# Patient Record
Sex: MALE | Race: WHITE | Employment: OTHER | URBAN - METROPOLITAN AREA
[De-identification: names, ages, dates, MRNs, and addresses within clinical notes are randomized per-mention and may not be internally consistent; named-entity substitution may affect disease eponyms.]

---

## 2020-11-16 LAB
ANTIBODY: NORMAL
CHOLESTEROL, TOTAL: 278 MG/DL
CHOLESTEROL/HDL RATIO: NORMAL
HDLC SERPL-MCNC: 69 MG/DL (ref 35–70)
HIV AG/AB: NORMAL
LDL CHOLESTEROL: 189
NONHDLC SERPL-MCNC: 209 MG/DL
TRIGL SERPL-MCNC: 98 MG/DL
VLDLC SERPL CALC-MCNC: NORMAL MG/DL

## 2023-04-12 PROBLEM — M77.12 LATERAL EPICONDYLITIS OF LEFT ELBOW: Status: ACTIVE | Noted: 2020-11-16

## 2023-04-12 PROBLEM — M79.671 PAIN IN BOTH FEET: Status: ACTIVE | Noted: 2019-07-10

## 2023-04-12 PROBLEM — M54.81 CERVICO-OCCIPITAL NEURALGIA: Status: ACTIVE | Noted: 2017-11-29

## 2023-04-12 PROBLEM — R42 DIZZINESS AFTER EXTENSION OF NECK: Status: ACTIVE | Noted: 2019-08-02

## 2023-04-12 PROBLEM — M96.1 CERVICAL POST-LAMINECTOMY SYNDROME: Status: ACTIVE | Noted: 2018-09-05

## 2023-04-12 PROBLEM — M79.672 PAIN IN BOTH FEET: Status: ACTIVE | Noted: 2019-07-10

## 2023-04-12 PROBLEM — R07.89 OTHER CHEST PAIN: Status: ACTIVE | Noted: 2019-08-02

## 2023-04-12 PROBLEM — F52.21 ED (ERECTILE DYSFUNCTION) OF NON-ORGANIC ORIGIN: Status: ACTIVE | Noted: 2020-11-16

## 2023-04-12 PROBLEM — K43.2 INCISIONAL HERNIA, WITHOUT OBSTRUCTION OR GANGRENE: Status: ACTIVE | Noted: 2019-08-29

## 2023-04-12 PROBLEM — M72.2 PLANTAR FASCIITIS, RIGHT: Status: ACTIVE | Noted: 2019-07-10

## 2023-04-12 PROBLEM — G43.909 MIGRAINE: Status: ACTIVE | Noted: 2017-11-29

## 2023-04-17 ENCOUNTER — TELEPHONE (OUTPATIENT)
Dept: FAMILY MEDICINE CLINIC | Age: 58
End: 2023-04-17

## 2023-04-17 NOTE — TELEPHONE ENCOUNTER
----- Message from Steven Parker sent at 4/17/2023 11:23 AM EDT -----  Subject: Medication Problem    Medication: amoxicillin-clavulanate (AUGMENTIN) 875-125 MG per tablet  Dosage: 875-125mg twice per day  Ordering Provider: thais    Question/Problem: patient is having stomach issues, cramping, diarrhea ,   no helping his cough, patient has taken medication for 5 days, but is not   tolerating the medication , needs something different , was told to call   back after 3 days if not improvement       Pharmacy: [facility]inne Ju[facility] [address]   [phone] Liliana Organ [phone]    ---------------------------------------------------------------------------  --------------  [address] [facility]  [phone]; OK to leave message on voicemail  ---------------------------------------------------------------------------  --------------    SCRIPT ANSWERS  Relationship to Patient: Self

## 2023-04-24 RX ORDER — OMEPRAZOLE 40 MG/1
40 CAPSULE, DELAYED RELEASE ORAL DAILY
Qty: 30 CAPSULE | Refills: 0 | Status: SHIPPED | OUTPATIENT
Start: 2023-04-24

## 2023-04-24 RX ORDER — METOPROLOL SUCCINATE 25 MG/1
25 TABLET, EXTENDED RELEASE ORAL DAILY
Qty: 30 TABLET | Refills: 0 | Status: SHIPPED | OUTPATIENT
Start: 2023-04-24

## 2023-04-24 RX ORDER — FEXOFENADINE HCL 180 MG/1
180 TABLET ORAL DAILY
Qty: 90 TABLET | Refills: 1 | Status: SHIPPED | OUTPATIENT
Start: 2023-04-24 | End: 2023-10-21

## 2023-04-24 RX ORDER — AMLODIPINE BESYLATE 5 MG/1
5 TABLET ORAL DAILY
Qty: 30 TABLET | Refills: 0 | Status: SHIPPED | OUTPATIENT
Start: 2023-04-24

## 2023-04-24 RX ORDER — CHLORTHALIDONE 25 MG/1
25 TABLET ORAL DAILY
Qty: 30 TABLET | Refills: 0 | Status: SHIPPED | OUTPATIENT
Start: 2023-04-24

## 2023-05-03 ENCOUNTER — TELEPHONE (OUTPATIENT)
Dept: FAMILY MEDICINE CLINIC | Age: 58
End: 2023-05-03

## 2023-05-11 ENCOUNTER — OFFICE VISIT (OUTPATIENT)
Dept: ENT CLINIC | Age: 58
End: 2023-05-11
Payer: MEDICARE

## 2023-05-11 VITALS — WEIGHT: 185 LBS | RESPIRATION RATE: 16 BRPM | HEIGHT: 70 IN | TEMPERATURE: 98 F | BODY MASS INDEX: 26.48 KG/M2

## 2023-05-11 DIAGNOSIS — M27.0 TORUS PALATINUS: Primary | ICD-10-CM

## 2023-05-11 PROCEDURE — 3017F COLORECTAL CA SCREEN DOC REV: CPT | Performed by: OTOLARYNGOLOGY

## 2023-05-11 PROCEDURE — G8427 DOCREV CUR MEDS BY ELIG CLIN: HCPCS | Performed by: OTOLARYNGOLOGY

## 2023-05-11 PROCEDURE — 1036F TOBACCO NON-USER: CPT | Performed by: OTOLARYNGOLOGY

## 2023-05-11 PROCEDURE — G8419 CALC BMI OUT NRM PARAM NOF/U: HCPCS | Performed by: OTOLARYNGOLOGY

## 2023-05-11 PROCEDURE — 99202 OFFICE O/P NEW SF 15 MIN: CPT | Performed by: OTOLARYNGOLOGY

## 2023-05-11 ASSESSMENT — ENCOUNTER SYMPTOMS
WHEEZING: 0
ABDOMINAL PAIN: 0
SHORTNESS OF BREATH: 0

## 2023-05-11 NOTE — PROGRESS NOTES
[address], [address], [facility] Patricia Canelo  P: [phone]       Patient     Alvin Gupta  1965    Chief Concern     Chief Complaint   Patient presents with    New Patient     States that he has a growth on the roof of his mouth, states that he would not have known it was there until he got food stuck in it    Ear Problem     States that he has been cleaning his ears constantly       Assessment and Plan      Diagnosis Orders   1. Torus palatinus          61-year-old male with concern for mass in the roof of his mouth-on examination we appreciate a bony torus palatinus, no tenderness to palpation, no overlying skin breakdown or masses. Notify the patient that this was likely a benign mass, asked him to call back if it is growing over time or if he is having significant difficulty forming a bolus due to mass effect from the bone. No follow-ups on file. History of Present Illness     61-year-old male with concern for growth on the roof of his mouth-noted it approximately 1 month ago when a black bean got caught on it, since then has had food caught on it 2-3 times, but no wilman dysphagia/odynophagia, voice changes, cough. Denies fevers, chills, night sweats, rapid weight loss, neck masses; no history of tobacco use.     Past Medical History     Past Medical History:   Diagnosis Date    Lymph node cancer ([address]     Testicle cancer ([address]        Past Surgical History     Past Surgical History:   Procedure Laterality Date    CERVICAL FUSION  1989    ORCHIECTOMY Left 1999    SHOULDER SURGERY Bilateral     both shoulders x 3 each    TONSILLECTOMY         Family History     Family History   Problem Relation Age of Onset    Hypertension Mother     Diabetes Father     Hypertension Father     Colon Cancer Father     Colon Cancer Paternal Grandfather        Social History     Social History     Tobacco Use    Smoking status: Never    Smokeless tobacco: Never   Vaping Use

## 2023-05-15 RX ORDER — OMEPRAZOLE 40 MG/1
CAPSULE, DELAYED RELEASE ORAL
Qty: 30 CAPSULE | Refills: 5 | Status: SHIPPED | OUTPATIENT
Start: 2023-05-15

## 2023-06-02 RX ORDER — METOPROLOL SUCCINATE 25 MG/1
25 TABLET, EXTENDED RELEASE ORAL DAILY
Qty: 90 TABLET | OUTPATIENT
Start: 2023-06-02

## 2023-06-02 RX ORDER — METOPROLOL SUCCINATE 25 MG/1
25 TABLET, EXTENDED RELEASE ORAL DAILY
Qty: 30 TABLET | Refills: 0 | Status: SHIPPED | OUTPATIENT
Start: 2023-06-02

## 2023-06-02 NOTE — TELEPHONE ENCOUNTER
Refill Request       Last Seen: Last Seen Department: 4/12/2023  Last Seen by PCP: 4/12/2023    Last Written: 04/24/2023    Has an appt with Cardiology on Tuesday     Next Appointment:   Future Appointments   Date Time Provider Arnaldo Jarai   6/6/2023  1:00 PM MD KIMBERLY Claudio   6/27/2023  1:10 PM Maral Holt MD [facility]         Requested Prescriptions     Pending Prescriptions Disp Refills    metoprolol succinate (TOPROL XL) 25 MG extended release tablet 30 tablet 0     Sig: Take 1 tablet by mouth daily

## 2023-06-06 ENCOUNTER — OFFICE VISIT (OUTPATIENT)
Dept: CARDIOLOGY CLINIC | Age: 58
End: 2023-06-06
Payer: MEDICARE

## 2023-06-06 VITALS
SYSTOLIC BLOOD PRESSURE: 118 MMHG | WEIGHT: 198 LBS | HEART RATE: 70 BPM | BODY MASS INDEX: 28.35 KG/M2 | HEIGHT: 70 IN | OXYGEN SATURATION: 96 % | DIASTOLIC BLOOD PRESSURE: 74 MMHG

## 2023-06-06 DIAGNOSIS — E78.5 HYPERLIPIDEMIA, UNSPECIFIED HYPERLIPIDEMIA TYPE: ICD-10-CM

## 2023-06-06 DIAGNOSIS — I25.10 CORONARY ARTERY DISEASE INVOLVING NATIVE CORONARY ARTERY OF NATIVE HEART WITHOUT ANGINA PECTORIS: ICD-10-CM

## 2023-06-06 DIAGNOSIS — R06.02 SOB (SHORTNESS OF BREATH): ICD-10-CM

## 2023-06-06 DIAGNOSIS — I10 ESSENTIAL HYPERTENSION: Primary | ICD-10-CM

## 2023-06-06 DIAGNOSIS — R42 DIZZINESS: ICD-10-CM

## 2023-06-06 PROBLEM — I25.119 CORONARY ARTERY DISEASE INVOLVING NATIVE CORONARY ARTERY OF NATIVE HEART WITH ANGINA PECTORIS (HCC): Status: ACTIVE | Noted: 2023-06-06

## 2023-06-06 PROCEDURE — G8419 CALC BMI OUT NRM PARAM NOF/U: HCPCS | Performed by: INTERNAL MEDICINE

## 2023-06-06 PROCEDURE — 3074F SYST BP LT 130 MM HG: CPT | Performed by: INTERNAL MEDICINE

## 2023-06-06 PROCEDURE — 3078F DIAST BP <80 MM HG: CPT | Performed by: INTERNAL MEDICINE

## 2023-06-06 PROCEDURE — G8427 DOCREV CUR MEDS BY ELIG CLIN: HCPCS | Performed by: INTERNAL MEDICINE

## 2023-06-06 PROCEDURE — 99204 OFFICE O/P NEW MOD 45 MIN: CPT | Performed by: INTERNAL MEDICINE

## 2023-06-06 RX ORDER — METHOCARBAMOL 750 MG/1
750 TABLET, FILM COATED ORAL 3 TIMES DAILY
COMMUNITY

## 2023-06-06 RX ORDER — MULTIVIT-MIN/IRON/FOLIC ACID/K 18-600-40
CAPSULE ORAL
COMMUNITY

## 2023-06-06 RX ORDER — OXYCODONE AND ACETAMINOPHEN 7.5; 325 MG/1; MG/1
1 TABLET ORAL EVERY 4 HOURS PRN
COMMUNITY

## 2023-06-06 NOTE — PROGRESS NOTES
with other health care professionals  () Ordering of unique tests, medications, or procedures  (X) Documentation within the EHR         All questions and concerns were addressed to the patient/family. Alternatives to my treatment were discussed. The note was completed using EMR. Every effort was made to ensure accuracy; however, inadvertent computerized transcription errors may be present.     Remington Ahmadi MD, [address], [place], [place]  [phone] [facility] office  [phone] [facility]  6/6/2023  1:09 PM

## 2023-06-06 NOTE — PATIENT INSTRUCTIONS
Your provider has ordered testing for further evaluation. An order/prescription has been included in your paper work. To schedule outpatient testing, contact Central Scheduling by calling [facility] ([phone]). Plan:  1. Order echocardiogram ~ shortness of breath and dizziness  ~A test that records movement of your heart valves and chambers by ultrasound. Evaluates heart valves, any chamber enlargement, abnormal openings, or any fluid in the sac surrounding the heart. 2. Order GXT stress myoview ~ shortness of breath and dizziness  ~A small IV will be placed into your arm to administer a radioisotope (myoview) to visualize your heart. . You will then waiting period to allow the tracer to be absorbed by the heart muscle. After the waiting period, we will take pictures of the blood flow to your heart muscle with the nuclear camera. Following your pictures, we will perform your stress test. We can do your stress test by having you walk on the treadmill or by giving you a medication (Chen Brendan) which makes your body think it is exercising. We will monitor you before, during, and after your stress test to make sure you are safe and comfortable. 3. I recommend that the patient continue their currently prescribed medications. Their drug modifiable risk factors appear to be well controlled. I will continue to address the need/dosing of medications in future visits. 4. Follow up based on testing results.

## 2023-06-22 ENCOUNTER — TELEPHONE (OUTPATIENT)
Dept: FAMILY MEDICINE CLINIC | Age: 58
End: 2023-06-22

## 2023-06-22 NOTE — TELEPHONE ENCOUNTER
Patient called and stated that the [facility] mail order pharmacy had his Norvasc medication at 10mg from when he lived in [place]. The patient stated that he has been taking 5 mg since his appt with Dr. Deanna Blackwood in April.  The patient just wanted to make sure that 5mg was the correct amount and if not, then a new order will need to be placed for the 10mg

## 2023-06-26 RX ORDER — LOSARTAN POTASSIUM 100 MG/1
100 TABLET ORAL DAILY
Qty: 90 TABLET | Refills: 3 | Status: SHIPPED | OUTPATIENT
Start: 2023-06-26

## 2023-07-10 RX ORDER — METOPROLOL SUCCINATE 25 MG/1
25 TABLET, EXTENDED RELEASE ORAL DAILY
Qty: 90 TABLET | Refills: 1 | Status: SHIPPED | OUTPATIENT
Start: 2023-07-10

## 2023-07-10 RX ORDER — METOPROLOL SUCCINATE 25 MG/1
25 TABLET, EXTENDED RELEASE ORAL DAILY
Qty: 30 TABLET | Refills: 0 | Status: SHIPPED | OUTPATIENT
Start: 2023-07-10 | End: 2023-07-10

## 2023-07-10 NOTE — TELEPHONE ENCOUNTER
Refill Request       Last Seen: Last Seen Department: 4/12/2023  Last Seen by PCP: 4/12/2023    Last Written: 6/2/23      Next Appointment:   Future Appointments   Date Time Provider [address] [place] [address]   7/24/2023  2:30 PM Albina Whitehead MD [facility]   8/2/2023 11:30 AM SCHEDULE, [facility] Zackery CERNA       Requested Prescriptions     Pending Prescriptions Disp Refills    metoprolol succinate (TOPROL XL) 25 MG extended release tablet [Pharmacy Med Name: METOPROLOL ER SUCCINATE 25MG TABS] 30 tablet 0     Sig: TAKE 1 TABLET BY MOUTH DAILY

## 2023-08-01 SDOH — HEALTH STABILITY: PHYSICAL HEALTH: ON AVERAGE, HOW MANY DAYS PER WEEK DO YOU ENGAGE IN MODERATE TO STRENUOUS EXERCISE (LIKE A BRISK WALK)?: 2 DAYS

## 2023-08-01 ASSESSMENT — PATIENT HEALTH QUESTIONNAIRE - PHQ9
10. IF YOU CHECKED OFF ANY PROBLEMS, HOW DIFFICULT HAVE THESE PROBLEMS MADE IT FOR YOU TO DO YOUR WORK, TAKE CARE OF THINGS AT HOME, OR GET ALONG WITH OTHER PEOPLE: 0
SUM OF ALL RESPONSES TO PHQ QUESTIONS 1-9: 9
6. FEELING BAD ABOUT YOURSELF - OR THAT YOU ARE A FAILURE OR HAVE LET YOURSELF OR YOUR FAMILY DOWN: 0
9. THOUGHTS THAT YOU WOULD BE BETTER OFF DEAD, OR OF HURTING YOURSELF: 0
SUM OF ALL RESPONSES TO PHQ QUESTIONS 1-9: 9
4. FEELING TIRED OR HAVING LITTLE ENERGY: 3
7. TROUBLE CONCENTRATING ON THINGS, SUCH AS READING THE NEWSPAPER OR WATCHING TELEVISION: 0
3. TROUBLE FALLING OR STAYING ASLEEP: 3
8. MOVING OR SPEAKING SO SLOWLY THAT OTHER PEOPLE COULD HAVE NOTICED. OR THE OPPOSITE, BEING SO FIGETY OR RESTLESS THAT YOU HAVE BEEN MOVING AROUND A LOT MORE THAN USUAL: 0
1. LITTLE INTEREST OR PLEASURE IN DOING THINGS: 1
SUM OF ALL RESPONSES TO PHQ9 QUESTIONS 1 & 2: 2
2. FEELING DOWN, DEPRESSED OR HOPELESS: 1
SUM OF ALL RESPONSES TO PHQ QUESTIONS 1-9: 9
5. POOR APPETITE OR OVEREATING: 1
SUM OF ALL RESPONSES TO PHQ QUESTIONS 1-9: 9

## 2023-08-01 ASSESSMENT — LIFESTYLE VARIABLES
HOW MANY STANDARD DRINKS CONTAINING ALCOHOL DO YOU HAVE ON A TYPICAL DAY: PATIENT DOES NOT DRINK
HOW OFTEN DO YOU HAVE A DRINK CONTAINING ALCOHOL: MONTHLY OR LESS
HOW MANY STANDARD DRINKS CONTAINING ALCOHOL DO YOU HAVE ON A TYPICAL DAY: 0
HOW OFTEN DO YOU HAVE SIX OR MORE DRINKS ON ONE OCCASION: 1
HOW OFTEN DO YOU HAVE A DRINK CONTAINING ALCOHOL: 2

## 2023-08-02 ENCOUNTER — TELEMEDICINE (OUTPATIENT)
Dept: FAMILY MEDICINE CLINIC | Age: 58
End: 2023-08-02
Payer: MEDICARE

## 2023-08-02 DIAGNOSIS — Z00.00 MEDICARE ANNUAL WELLNESS VISIT, SUBSEQUENT: Primary | ICD-10-CM

## 2023-08-02 DIAGNOSIS — Z00.00 INITIAL MEDICARE ANNUAL WELLNESS VISIT: ICD-10-CM

## 2023-08-02 PROCEDURE — 3017F COLORECTAL CA SCREEN DOC REV: CPT | Performed by: STUDENT IN AN ORGANIZED HEALTH CARE EDUCATION/TRAINING PROGRAM

## 2023-08-02 PROCEDURE — G0439 PPPS, SUBSEQ VISIT: HCPCS | Performed by: STUDENT IN AN ORGANIZED HEALTH CARE EDUCATION/TRAINING PROGRAM

## 2023-08-02 NOTE — PATIENT INSTRUCTIONS
Personalized Preventive Plan for Khadijah Sneed - 8/2/2023  Medicare offers a range of preventive health benefits. Some of the tests and screenings are paid in full while other may be subject to a deductible, co-insurance, and/or copay. Some of these benefits include a comprehensive review of your medical history including lifestyle, illnesses that may run in your family, and various assessments and screenings as appropriate. After reviewing your medical record and screening and assessments performed today your provider may have ordered immunizations, labs, imaging, and/or referrals for you. A list of these orders (if applicable) as well as your Preventive Care list are included within your After Visit Summary for your review. Other Preventive Recommendations:    A preventive eye exam performed by an eye specialist is recommended every 1-2 years to screen for glaucoma; cataracts, macular degeneration, and other eye disorders. A preventive dental visit is recommended every 6 months. Try to get at least 150 minutes of exercise per week or 10,000 steps per day on a pedometer . Order or download the FREE \"Exercise & Physical Activity: Your Everyday Guide\" from [employer]. Call 0-664.261.4975 or search [employer] online. You need 6440-9293 mg of calcium and 0040-3284 IU of vitamin D per day. It is possible to meet your calcium requirement with diet alone, but a vitamin D supplement is usually necessary to meet this goal.  When exposed to the sun, use a sunscreen that protects against both UVA and UVB radiation with an SPF of 30 or greater. Reapply every 2 to 3 hours or after sweating, drying off with a towel, or swimming. Always wear a seat belt when traveling in a car. Always wear a helmet when riding a bicycle or motorcycle. Learning About Mindfulness for Stress  What are mindfulness and stress? Stress is your body's response to a hard situation.

## 2023-08-02 NOTE — PROGRESS NOTES
Medicare Annual Wellness Visit    Génesis Bullock is here for Medicare AWV    Assessment & Plan   Medicare annual wellness visit, subsequent  Recommendations for Preventive Services Due: see orders and patient instructions/AVS.  Recommended screening schedule for the next 5-10 years is provided to the patient in written form: see Patient Instructions/AVS.     No follow-ups on file. Subjective       Patient's complete Health Risk Assessment and screening values have been reviewed and are found in Flowsheets. The following problems were reviewed today and where indicated follow up appointments were made and/or referrals ordered. Positive Risk Factor Screenings with Interventions:        Depression:  PHQ-2 Score: 2  PHQ-9 Total Score: 9    Interpretation:   1-4 = minimal  5-9 = mild  10-14 = moderate  15-19 = moderately severe  20-27 = severe  Interventions: Will discuss sleeping issues  and pain with pain specialist- has appt today           Opioid Risk: (Low risk score <55) Opioid risk score: 17    Patient is low risk for opioid use disorder or overdose. Last PDMP Polo Torres as Reviewed:  Review User Review Instant Review Result   Trevor Duque 6/27/2023  1:18 PM     Reviewed PDMP [1]         General HRA Questions:  Select all that apply: (!) New or Increased Fatigue    Fatigue Interventions:  States not sleeping as well since change in pain meds- will discuss with pain management specialist at appt today        Dentist Screen:  Have you seen the dentist within the past year?: (!) No    Intervention:  Advised to schedule with their dentist     Vision Screen:  Do you have difficulty driving, watching TV, or doing any of your daily activities because of your eyesight?: No  Have you had an eye exam within the past year?: (!) No  No results found.     Interventions:   Patient encouraged to make appointment with their eye specialist    Safety:  Do you have either shower bars, grab bars, non-slip mats or non-slip

## 2023-08-09 RX ORDER — OMEPRAZOLE 40 MG/1
CAPSULE, DELAYED RELEASE ORAL
Qty: 90 CAPSULE | OUTPATIENT
Start: 2023-08-09

## 2023-09-09 ENCOUNTER — TELEPHONE (OUTPATIENT)
Dept: FAMILY MEDICINE CLINIC | Age: 58
End: 2023-09-09

## 2023-09-09 DIAGNOSIS — U07.1 COVID-19: Primary | ICD-10-CM

## 2023-09-12 ENCOUNTER — TELEPHONE (OUTPATIENT)
Dept: FAMILY MEDICINE CLINIC | Age: 58
End: 2023-09-12

## 2023-09-12 RX ORDER — ONDANSETRON 4 MG/1
4 TABLET, ORALLY DISINTEGRATING ORAL 3 TIMES DAILY PRN
Qty: 21 TABLET | Refills: 0 | Status: SHIPPED | OUTPATIENT
Start: 2023-09-12

## 2023-09-12 NOTE — TELEPHONE ENCOUNTER
Patient called and has been taking Paxlovid for Covid. Patient has been taking for 3 full days. Patient has 5 days left of it. Patient states that he is not able to keep anything down, dry heaving, all smells are making him sick. Patient stated that he hasn't been able to take his other medications because of this. He did not know what he should do, he is concerned because he hasn't been able to take other meds. He was wondering if this was a side effect of the paxlovid?

## 2023-09-12 NOTE — TELEPHONE ENCOUNTER
I spoke to the patient who stated that he has been able to keep an egg sandwich down today and has been drinking some. He states that he just feels naseous 24/7, he did urinate this  morning as well.  Patient would like for zofran to be sent to [facility] in [place] on 28

## 2023-09-18 RX ORDER — FEXOFENADINE HCL 180 MG/1
180 TABLET ORAL DAILY
Qty: 90 TABLET | Refills: 1 | Status: SHIPPED | OUTPATIENT
Start: 2023-09-18

## 2023-09-18 NOTE — TELEPHONE ENCOUNTER
Refill Request     Last Seen: Last Seen Department: 8/2/2023  Last Seen by PCP: 8/2/2023    Last Written: 4/24/23      Next Appointment:   Future Appointments   Date Time Provider [address] [place] [address]   10/3/2023  2:10 PM CHARITO Chandler CNP [facility]         Requested Prescriptions     Pending Prescriptions Disp Refills    fexofenadine (ALLEGRA) 180 MG tablet [Pharmacy Med Name: FEXOFENADINE HYDROCHLORIDE 180 MG Tablet] 90 tablet 1     Sig: TAKE 1 TABLET EVERY DAY

## 2023-10-03 PROBLEM — M48.02 CERVICAL STENOSIS OF SPINAL CANAL: Status: ACTIVE | Noted: 2023-10-03

## 2023-10-03 PROBLEM — M47.812 FACET ARTHROPATHY, CERVICAL: Status: ACTIVE | Noted: 2023-10-03

## 2023-12-05 RX ORDER — OMEPRAZOLE 40 MG/1
CAPSULE, DELAYED RELEASE ORAL
Qty: 90 CAPSULE | Refills: 3 | Status: SHIPPED | OUTPATIENT
Start: 2023-12-05

## 2023-12-06 RX ORDER — METOPROLOL SUCCINATE 25 MG/1
25 TABLET, EXTENDED RELEASE ORAL DAILY
Qty: 90 TABLET | Refills: 1 | Status: SHIPPED | OUTPATIENT
Start: 2023-12-06

## 2023-12-06 NOTE — PROGRESS NOTES
Refill Request     Last Seen: Last Seen Department: 8/2/2023  Last Seen by PCP: Visit date not found    Last Written: 7/10/23    Next Appointment:   Future Appointments   Date Time Provider [address] [place] [address]   12/21/2023 10:40 AM Mitzi Astorga, APRN - CNP [facility]       Requested Prescriptions     Pending Prescriptions Disp Refills    metoprolol succinate (TOPROL XL) 25 MG extended release tablet 90 tablet 1     Sig: Take 1 tablet by mouth daily

## 2023-12-07 RX ORDER — LOSARTAN POTASSIUM 100 MG/1
100 TABLET ORAL DAILY
Qty: 90 TABLET | Refills: 1 | Status: SHIPPED | OUTPATIENT
Start: 2023-12-07

## 2023-12-07 NOTE — TELEPHONE ENCOUNTER
Pts pharmacy called for a refill of Losartan 100mg.  Preferred pharmacy is [facility]    Last ov 06/06/2023 rickey

## 2023-12-21 PROBLEM — M50.20 CERVICAL DISC DISPLACEMENT: Status: ACTIVE | Noted: 2023-12-21

## 2024-03-04 RX ORDER — GABAPENTIN 400 MG/1
400 CAPSULE ORAL 3 TIMES DAILY
Qty: 90 CAPSULE | Refills: 0 | Status: SHIPPED | OUTPATIENT
Start: 2024-03-04 | End: 2024-04-01

## 2024-03-04 NOTE — TELEPHONE ENCOUNTER
Refill Request     Last Seen: Last Seen Department: 8/2/2023  Last Seen by PCP: 8/2/2023    Last Written: 04/12/23      Next Appointment:   Future Appointments   Date Time Provider Department Center   4/10/2024  3:30 PM Demi Astorga APRN - CNP [facility]           Requested Prescriptions     Pending Prescriptions Disp Refills    gabapentin (NEURONTIN) 400 MG capsule [Pharmacy Med Name: GABAPENTIN 400MG CAPSULES] 90 capsule 2     Sig: TAKE 1 CAPSULE BY MOUTH THREE TIMES DAILY

## 2024-03-21 ASSESSMENT — PATIENT HEALTH QUESTIONNAIRE - PHQ9
3. TROUBLE FALLING OR STAYING ASLEEP: SEVERAL DAYS
1. LITTLE INTEREST OR PLEASURE IN DOING THINGS: NOT AT ALL
7. TROUBLE CONCENTRATING ON THINGS, SUCH AS READING THE NEWSPAPER OR WATCHING TELEVISION: NOT AT ALL
4. FEELING TIRED OR HAVING LITTLE ENERGY: SEVERAL DAYS
4. FEELING TIRED OR HAVING LITTLE ENERGY: SEVERAL DAYS
3. TROUBLE FALLING OR STAYING ASLEEP: SEVERAL DAYS
SUM OF ALL RESPONSES TO PHQ9 QUESTIONS 1 & 2: 1
7. TROUBLE CONCENTRATING ON THINGS, SUCH AS READING THE NEWSPAPER OR WATCHING TELEVISION: NOT AT ALL
SUM OF ALL RESPONSES TO PHQ QUESTIONS 1-9: 3
5. POOR APPETITE OR OVEREATING: NOT AT ALL
SUM OF ALL RESPONSES TO PHQ QUESTIONS 1-9: 3
1. LITTLE INTEREST OR PLEASURE IN DOING THINGS: NOT AT ALL
10. IF YOU CHECKED OFF ANY PROBLEMS, HOW DIFFICULT HAVE THESE PROBLEMS MADE IT FOR YOU TO DO YOUR WORK, TAKE CARE OF THINGS AT HOME, OR GET ALONG WITH OTHER PEOPLE: SOMEWHAT DIFFICULT
6. FEELING BAD ABOUT YOURSELF - OR THAT YOU ARE A FAILURE OR HAVE LET YOURSELF OR YOUR FAMILY DOWN: NOT AT ALL
9. THOUGHTS THAT YOU WOULD BE BETTER OFF DEAD, OR OF HURTING YOURSELF: NOT AT ALL
5. POOR APPETITE OR OVEREATING: NOT AT ALL
8. MOVING OR SPEAKING SO SLOWLY THAT OTHER PEOPLE COULD HAVE NOTICED. OR THE OPPOSITE, BEING SO FIGETY OR RESTLESS THAT YOU HAVE BEEN MOVING AROUND A LOT MORE THAN USUAL: NOT AT ALL
9. THOUGHTS THAT YOU WOULD BE BETTER OFF DEAD, OR OF HURTING YOURSELF: NOT AT ALL
8. MOVING OR SPEAKING SO SLOWLY THAT OTHER PEOPLE COULD HAVE NOTICED. OR THE OPPOSITE - BEING SO FIDGETY OR RESTLESS THAT YOU HAVE BEEN MOVING AROUND A LOT MORE THAN USUAL: NOT AT ALL
10. IF YOU CHECKED OFF ANY PROBLEMS, HOW DIFFICULT HAVE THESE PROBLEMS MADE IT FOR YOU TO DO YOUR WORK, TAKE CARE OF THINGS AT HOME, OR GET ALONG WITH OTHER PEOPLE: SOMEWHAT DIFFICULT
2. FEELING DOWN, DEPRESSED OR HOPELESS: SEVERAL DAYS
SUM OF ALL RESPONSES TO PHQ QUESTIONS 1-9: 3
2. FEELING DOWN, DEPRESSED OR HOPELESS: SEVERAL DAYS
6. FEELING BAD ABOUT YOURSELF - OR THAT YOU ARE A FAILURE OR HAVE LET YOURSELF OR YOUR FAMILY DOWN: NOT AT ALL
SUM OF ALL RESPONSES TO PHQ QUESTIONS 1-9: 3
SUM OF ALL RESPONSES TO PHQ QUESTIONS 1-9: 3

## 2024-03-25 ENCOUNTER — OFFICE VISIT (OUTPATIENT)
Dept: FAMILY MEDICINE CLINIC | Age: 59
End: 2024-03-25
Payer: MEDICARE

## 2024-03-25 VITALS
DIASTOLIC BLOOD PRESSURE: 84 MMHG | WEIGHT: 205.4 LBS | SYSTOLIC BLOOD PRESSURE: 128 MMHG | HEART RATE: 59 BPM | OXYGEN SATURATION: 95 % | BODY MASS INDEX: 29.47 KG/M2

## 2024-03-25 DIAGNOSIS — K62.5 BRBPR (BRIGHT RED BLOOD PER RECTUM): ICD-10-CM

## 2024-03-25 DIAGNOSIS — R63.1 POLYDIPSIA: ICD-10-CM

## 2024-03-25 DIAGNOSIS — E87.6 HYPOKALEMIA: ICD-10-CM

## 2024-03-25 DIAGNOSIS — R73.01 IMPAIRED FASTING BLOOD SUGAR: ICD-10-CM

## 2024-03-25 DIAGNOSIS — S39.012A BACK STRAIN, INITIAL ENCOUNTER: Primary | ICD-10-CM

## 2024-03-25 DIAGNOSIS — R35.89 POLYURIA: ICD-10-CM

## 2024-03-25 DIAGNOSIS — R13.12 OROPHARYNGEAL DYSPHAGIA: ICD-10-CM

## 2024-03-25 DIAGNOSIS — M54.81 CERVICO-OCCIPITAL NEURALGIA: ICD-10-CM

## 2024-03-25 DIAGNOSIS — K21.9 GASTROESOPHAGEAL REFLUX DISEASE, UNSPECIFIED WHETHER ESOPHAGITIS PRESENT: ICD-10-CM

## 2024-03-25 DIAGNOSIS — R29.898 WEAKNESS OF BOTH UPPER EXTREMITIES: ICD-10-CM

## 2024-03-25 DIAGNOSIS — I10 ESSENTIAL HYPERTENSION: ICD-10-CM

## 2024-03-25 LAB
ALBUMIN SERPL-MCNC: 4.6 G/DL (ref 3.4–5)
ALBUMIN/GLOB SERPL: 1.8 {RATIO} (ref 1.1–2.2)
ALP SERPL-CCNC: 56 U/L (ref 40–129)
ALT SERPL-CCNC: 22 U/L (ref 10–40)
ANION GAP SERPL CALCULATED.3IONS-SCNC: 12 MMOL/L (ref 3–16)
AST SERPL-CCNC: 19 U/L (ref 15–37)
BASOPHILS # BLD: 0.1 K/UL (ref 0–0.2)
BASOPHILS NFR BLD: 1 %
BILIRUB SERPL-MCNC: 0.7 MG/DL (ref 0–1)
BILIRUBIN, POC: NEGATIVE
BLOOD URINE, POC: NEGATIVE
BUN SERPL-MCNC: 13 MG/DL (ref 7–20)
CALCIUM SERPL-MCNC: 9.7 MG/DL (ref 8.3–10.6)
CHLORIDE SERPL-SCNC: 95 MMOL/L (ref 99–110)
CLARITY, POC: NORMAL
CO2 SERPL-SCNC: 30 MMOL/L (ref 21–32)
COLOR, POC: NORMAL
CREAT SERPL-MCNC: 0.8 MG/DL (ref 0.8–1.3)
DEPRECATED RDW RBC AUTO: 13 % (ref 12.4–15.4)
EOSINOPHIL # BLD: 0.2 K/UL (ref 0–0.6)
EOSINOPHIL NFR BLD: 2.9 %
GFR SERPLBLD CREATININE-BSD FMLA CKD-EPI: >90 ML/MIN/{1.73_M2}
GLUCOSE SERPL-MCNC: 97 MG/DL (ref 70–99)
GLUCOSE URINE, POC: NEGATIVE
HCT VFR BLD AUTO: 42.8 % (ref 40.5–52.5)
HGB BLD-MCNC: 15.7 G/DL (ref 13.5–17.5)
KETONES, POC: NEGATIVE
LEUKOCYTE EST, POC: NEGATIVE
LYMPHOCYTES # BLD: 1.9 K/UL (ref 1–5.1)
LYMPHOCYTES NFR BLD: 28.9 %
MCH RBC QN AUTO: 31.4 PG (ref 26–34)
MCHC RBC AUTO-ENTMCNC: 36.5 G/DL (ref 31–36)
MCV RBC AUTO: 86 FL (ref 80–100)
MONOCYTES # BLD: 0.6 K/UL (ref 0–1.3)
MONOCYTES NFR BLD: 9.8 %
NEUTROPHILS # BLD: 3.8 K/UL (ref 1.7–7.7)
NEUTROPHILS NFR BLD: 57.4 %
NITRITE, POC: NEGATIVE
PH, POC: 7.5
PLATELET # BLD AUTO: 261 K/UL (ref 135–450)
PMV BLD AUTO: 7.9 FL (ref 5–10.5)
POTASSIUM SERPL-SCNC: 3.9 MMOL/L (ref 3.5–5.1)
PROT SERPL-MCNC: 7.2 G/DL (ref 6.4–8.2)
PROTEIN, POC: NEGATIVE
RBC # BLD AUTO: 4.98 M/UL (ref 4.2–5.9)
SODIUM SERPL-SCNC: 137 MMOL/L (ref 136–145)
SPECIFIC GRAVITY, POC: 1.02
TSH SERPL DL<=0.005 MIU/L-ACNC: 1.42 UIU/ML (ref 0.27–4.2)
UROBILINOGEN, POC: 0.2
WBC # BLD AUTO: 6.6 K/UL (ref 4–11)

## 2024-03-25 PROCEDURE — 3079F DIAST BP 80-89 MM HG: CPT | Performed by: STUDENT IN AN ORGANIZED HEALTH CARE EDUCATION/TRAINING PROGRAM

## 2024-03-25 PROCEDURE — G8427 DOCREV CUR MEDS BY ELIG CLIN: HCPCS | Performed by: STUDENT IN AN ORGANIZED HEALTH CARE EDUCATION/TRAINING PROGRAM

## 2024-03-25 PROCEDURE — G8419 CALC BMI OUT NRM PARAM NOF/U: HCPCS | Performed by: STUDENT IN AN ORGANIZED HEALTH CARE EDUCATION/TRAINING PROGRAM

## 2024-03-25 PROCEDURE — G8484 FLU IMMUNIZE NO ADMIN: HCPCS | Performed by: STUDENT IN AN ORGANIZED HEALTH CARE EDUCATION/TRAINING PROGRAM

## 2024-03-25 PROCEDURE — 1036F TOBACCO NON-USER: CPT | Performed by: STUDENT IN AN ORGANIZED HEALTH CARE EDUCATION/TRAINING PROGRAM

## 2024-03-25 PROCEDURE — 3017F COLORECTAL CA SCREEN DOC REV: CPT | Performed by: STUDENT IN AN ORGANIZED HEALTH CARE EDUCATION/TRAINING PROGRAM

## 2024-03-25 PROCEDURE — 36415 COLL VENOUS BLD VENIPUNCTURE: CPT | Performed by: STUDENT IN AN ORGANIZED HEALTH CARE EDUCATION/TRAINING PROGRAM

## 2024-03-25 PROCEDURE — 3074F SYST BP LT 130 MM HG: CPT | Performed by: STUDENT IN AN ORGANIZED HEALTH CARE EDUCATION/TRAINING PROGRAM

## 2024-03-25 PROCEDURE — 99214 OFFICE O/P EST MOD 30 MIN: CPT | Performed by: STUDENT IN AN ORGANIZED HEALTH CARE EDUCATION/TRAINING PROGRAM

## 2024-03-25 PROCEDURE — 81002 URINALYSIS NONAUTO W/O SCOPE: CPT | Performed by: STUDENT IN AN ORGANIZED HEALTH CARE EDUCATION/TRAINING PROGRAM

## 2024-03-25 RX ORDER — TIZANIDINE 4 MG/1
4 TABLET ORAL 3 TIMES DAILY PRN
Qty: 30 TABLET | Refills: 0 | Status: SHIPPED | OUTPATIENT
Start: 2024-03-25

## 2024-03-25 RX ORDER — PREDNISONE 10 MG/1
TABLET ORAL
Qty: 30 TABLET | Refills: 0 | Status: SHIPPED | OUTPATIENT
Start: 2024-03-25 | End: 2024-04-06

## 2024-03-25 ASSESSMENT — ENCOUNTER SYMPTOMS
SHORTNESS OF BREATH: 0
BACK PAIN: 1
COUGH: 0
RHINORRHEA: 0

## 2024-03-25 NOTE — PROGRESS NOTES
Lower extremity strength 5 out of 5 bilaterally.  Upper extremity strength 4 out of 4 with spastic movements.  Negative Spurling.   Neurological:      General: No focal deficit present.      Mental Status: He is alert.   Psychiatric:         Mood and Affect: Mood normal.         Lupillo Phillips,     This dictation was generated by voice recognition computer software.  Although all attempts are made to edit the dictation for accuracy, there may be errors in the transcription that are not intended.

## 2024-03-26 LAB
EST. AVERAGE GLUCOSE BLD GHB EST-MCNC: 111.2 MG/DL
HBA1C MFR BLD: 5.5 %
OSMOLALITY UR: 626 MOSM/KG (ref 390–1070)

## 2024-04-01 RX ORDER — GABAPENTIN 400 MG/1
400 CAPSULE ORAL 3 TIMES DAILY
Qty: 90 CAPSULE | Refills: 0 | Status: SHIPPED | OUTPATIENT
Start: 2024-04-01 | End: 2024-05-01

## 2024-04-01 NOTE — TELEPHONE ENCOUNTER
Refill Request     Last Seen: Last Seen Department: 3/25/2024  Last Seen by PCP: 3/25/2024    Last Written: 3/4/24      Next Appointment:   Future Appointments   Date Time Provider Department Center   4/10/2024  3:30 PM Demi Astorga APRN - CNP [facility]           Requested Prescriptions     Pending Prescriptions Disp Refills    gabapentin (NEURONTIN) 400 MG capsule [Pharmacy Med Name: GABAPENTIN 400MG CAPSULES] 90 capsule 0     Sig: TAKE 1 CAPSULE BY MOUTH THREE TIMES DAILY

## 2024-04-10 PROBLEM — G89.4 CHRONIC PAIN SYNDROME: Status: ACTIVE | Noted: 2024-04-10

## 2024-04-25 RX ORDER — FEXOFENADINE HCL 180 MG/1
180 TABLET ORAL DAILY
Qty: 90 TABLET | Refills: 3 | Status: SHIPPED | OUTPATIENT
Start: 2024-04-25

## 2024-04-25 NOTE — TELEPHONE ENCOUNTER
Refill Request     Last Seen: Last Seen Department: 3/25/2024  Last Seen by PCP: 3/25/2024    Last Written: 9/18/23      Next Appointment:   Future Appointments   Date Time Provider Department Center   6/4/2024  2:40 PM Demi Astorga APRN - CNP [facility]           Requested Prescriptions     Pending Prescriptions Disp Refills    fexofenadine (ALLEGRA) 180 MG tablet [Pharmacy Med Name: FEXOFENADINE HYDROCHLORIDE 180 MG Tablet] 90 tablet 3     Sig: TAKE 1 TABLET EVERY DAY

## 2024-04-26 NOTE — TELEPHONE ENCOUNTER
6/6/2023 [facility]  No upcoming appt  3/25/2024 cmp, cbc  Jacqueline contact pt for yearly appt/med refill appt

## 2024-04-29 RX ORDER — GABAPENTIN 400 MG/1
400 CAPSULE ORAL 3 TIMES DAILY
Qty: 90 CAPSULE | Refills: 0 | OUTPATIENT
Start: 2024-04-29

## 2024-04-29 RX ORDER — LOSARTAN POTASSIUM 100 MG/1
100 TABLET ORAL DAILY
Qty: 30 TABLET | Refills: 1 | Status: SHIPPED | OUTPATIENT
Start: 2024-04-29

## 2024-04-29 NOTE — TELEPHONE ENCOUNTER
Refill Request     Last Seen: Last Seen Department: 3/25/2024  Last Seen by PCP: 3/25/2024    Last Written: 4/1/24      Next Appointment:   Future Appointments   Date Time Provider Department Center   6/4/2024  2:40 PM Demi Astorga APRN - CNP [facility]           Requested Prescriptions     Pending Prescriptions Disp Refills    gabapentin (NEURONTIN) 400 MG capsule [Pharmacy Med Name: GABAPENTIN 400MG CAPSULES] 90 capsule 0     Sig: TAKE 1 CAPSULE BY MOUTH THREE TIMES DAILY

## 2024-04-29 NOTE — TELEPHONE ENCOUNTER
4/29- called pt @[phone]. Lvm informing pt to call back to schedule annual apt with [facility] for continuation of med refills.     6/06/2023 [facility]

## 2024-05-02 RX ORDER — GABAPENTIN 400 MG/1
400 CAPSULE ORAL 3 TIMES DAILY
Qty: 90 CAPSULE | Refills: 2 | Status: SHIPPED | OUTPATIENT
Start: 2024-05-02 | End: 2024-07-31

## 2024-05-02 RX ORDER — GABAPENTIN 400 MG/1
400 CAPSULE ORAL 3 TIMES DAILY
Qty: 90 CAPSULE | Refills: 0 | OUTPATIENT
Start: 2024-05-02

## 2024-05-02 NOTE — TELEPHONE ENCOUNTER
Refill Request         Last Seen: Last Seen Department: 3/25/2024  Last Seen by PCP: 2/16/2024    Last Written: 04/01/2024      Next Appointment:   Future Appointments   Date Time Provider Department Center   6/4/2024  2:40 PM Demi Astorga APRN - CNP [facility]   6/18/2024  1:45 PM eDvika Suero MD [facility]         Requested Prescriptions     Pending Prescriptions Disp Refills    gabapentin (NEURONTIN) 400 MG capsule [Pharmacy Med Name: GABAPENTIN 400MG CAPSULES] 90 capsule 0     Sig: Take 1 capsule by mouth 3 times daily.

## 2024-06-17 RX ORDER — LOSARTAN POTASSIUM 100 MG/1
100 TABLET ORAL DAILY
Qty: 30 TABLET | Refills: 0 | Status: SHIPPED | OUTPATIENT
Start: 2024-06-17

## 2024-06-18 ENCOUNTER — TELEPHONE (OUTPATIENT)
Dept: CARDIOLOGY CLINIC | Age: 59
End: 2024-06-18

## 2024-06-18 ENCOUNTER — OFFICE VISIT (OUTPATIENT)
Dept: CARDIOLOGY CLINIC | Age: 59
End: 2024-06-18
Payer: MEDICARE

## 2024-06-18 VITALS
HEIGHT: 70 IN | BODY MASS INDEX: 28.63 KG/M2 | OXYGEN SATURATION: 95 % | WEIGHT: 200 LBS | SYSTOLIC BLOOD PRESSURE: 108 MMHG | HEART RATE: 52 BPM | DIASTOLIC BLOOD PRESSURE: 74 MMHG

## 2024-06-18 DIAGNOSIS — R00.2 PALPITATIONS: ICD-10-CM

## 2024-06-18 DIAGNOSIS — R06.02 SHORTNESS OF BREATH: Primary | ICD-10-CM

## 2024-06-18 DIAGNOSIS — R06.02 SOB (SHORTNESS OF BREATH): ICD-10-CM

## 2024-06-18 DIAGNOSIS — R42 DIZZINESS: ICD-10-CM

## 2024-06-18 DIAGNOSIS — R09.89 OTHER SPECIFIED SYMPTOMS AND SIGNS INVOLVING THE CIRCULATORY AND RESPIRATORY SYSTEMS: ICD-10-CM

## 2024-06-18 DIAGNOSIS — Z82.49 FAMILY HISTORY OF CAROTID ENDARTERECTOMY: ICD-10-CM

## 2024-06-18 DIAGNOSIS — I25.119 CORONARY ARTERY DISEASE INVOLVING NATIVE CORONARY ARTERY OF NATIVE HEART WITH ANGINA PECTORIS (HCC): ICD-10-CM

## 2024-06-18 DIAGNOSIS — I25.10 CORONARY ARTERY CALCIFICATION SEEN ON CT SCAN: ICD-10-CM

## 2024-06-18 PROCEDURE — 1036F TOBACCO NON-USER: CPT | Performed by: INTERNAL MEDICINE

## 2024-06-18 PROCEDURE — 3074F SYST BP LT 130 MM HG: CPT | Performed by: INTERNAL MEDICINE

## 2024-06-18 PROCEDURE — G8419 CALC BMI OUT NRM PARAM NOF/U: HCPCS | Performed by: INTERNAL MEDICINE

## 2024-06-18 PROCEDURE — 3078F DIAST BP <80 MM HG: CPT | Performed by: INTERNAL MEDICINE

## 2024-06-18 PROCEDURE — G8427 DOCREV CUR MEDS BY ELIG CLIN: HCPCS | Performed by: INTERNAL MEDICINE

## 2024-06-18 PROCEDURE — 3017F COLORECTAL CA SCREEN DOC REV: CPT | Performed by: INTERNAL MEDICINE

## 2024-06-18 PROCEDURE — 99214 OFFICE O/P EST MOD 30 MIN: CPT | Performed by: INTERNAL MEDICINE

## 2024-06-18 NOTE — PROGRESS NOTES
[facility] [place]   Cardiovascular Evaluation    PATIENT: Tono Grossman  DATE: 2024  MRN: [id number]  CSN: [id number]  : 1965    Primary Care Doctor/Referring provider: Lupillo Phillips DO, No admitting provider for patient encounter.     Reason for evaluation/Chief complaint:   Follow-up, Hypertension, Coronary Artery Disease, Hyperlipidemia, and Shortness of Breath      Subjective:    History of present illness on initial date of evaluation:   Tono Grossman is a 58 y.o. patient who presents for cardiology follow up. He initially was seen to establish cardiology care from moving from [place]. He has a past medical history including CAD, GERD, HTN, HLD, family history of early CAD, and anxiety. He had a exercise stress echocardiogram 19 study was normal. He had a CT Chest 2020 with calcific coronary plaque in LAD. He had a Lexiscan myoview 10/2022 with fixed defect, no ischemia.   Today he reports he is recovering from COVID. He states he lives on third floor. He has noticed SOB with ambulating to his floor with walking. He has noted SOB with exertion. Patient currently denies any weight gain, edema, palpitations, chest pain, dizziness, and syncope. He is taking medications as prescribed. He states he a smart watch that has been indicating irregular heart beat. He will feel dizziness and heart racing. Patient reports father had carotid endarterectomy age 70's.     Patient Active Problem List   Diagnosis    Attention deficit disorder    Allergic rhinitis    Asthma    Brachial neuritis or radiculitis    Cervical post-laminectomy syndrome    Cervico-occipital neuralgia    Dizziness after extension of neck    ED (erectile dysfunction) of non-organic origin    Essential hypertension    Gastroesophageal reflux disease    High prostate specific antigen (PSA)    Incisional hernia, without obstruction or gangrene    Lateral epicondylitis of left elbow    Migraine

## 2024-06-18 NOTE — TELEPHONE ENCOUNTER
Monitor placed by Berna  Monitor company [employer]  Length of monitor 2 weeks  Monitor ordered by [facility]  Phone ID [email or web address]  Kit ID [id number], [id number]  Activation successful prior to pt leaving office? Yes

## 2024-06-18 NOTE — PATIENT INSTRUCTIONS
Your provider has ordered testing for further evaluation.  An order/prescription has been included in your paper work.   To schedule outpatient testing, contact Central Scheduling by calling [facility] ([phone]).     GXT or Lexiscan Myoview Stress Test instructions:   -Allow 3-4 hours for the entire test to be complete.  -Nothing to eat or drink after midnight prior to testing. May take prescribed medications in morning with sip of water.  -Hold diabetes medication and Insulin morning of testing. Bring glucose meter and glucose tablet if available.   -Do not drink or eat anything containing caffeine 24 hours prior to test. This includes coffee, decaffeinated coffee, chocolate, soda, or tea.  -Hold metoprolol succinate for treadmill (GXT) stress testing hold for 48 hours prior to test. Bring medication with you to testing.   -No smoking for 12 hours prior to testing.       Plan:  1. Order echocardiogram ~ shortness of breath, dizziness  2. Order GXT stress myoview ~ shortness of breath, dizziness  3. Order cardiac event monitor 2 weeks ~ palpitations   4. Order carotid doppler~ family history of carotid endarterectomy, prior stent history CAD  5. Follow up dependent on results, if within normal limits one year follow up

## 2024-06-24 ENCOUNTER — HOSPITAL ENCOUNTER (OUTPATIENT)
Dept: VASCULAR LAB | Age: 59
Discharge: HOME | End: 2024-06-26
Attending: INTERNAL MEDICINE
Payer: MEDICARE

## 2024-06-24 DIAGNOSIS — I25.119 CORONARY ARTERY DISEASE INVOLVING NATIVE CORONARY ARTERY OF NATIVE HEART WITH ANGINA PECTORIS (HCC): ICD-10-CM

## 2024-06-24 DIAGNOSIS — Z82.49 FAMILY HISTORY OF CAROTID ENDARTERECTOMY: ICD-10-CM

## 2024-06-24 DIAGNOSIS — R42 DIZZINESS: ICD-10-CM

## 2024-06-24 LAB
VAS LEFT ARM BP: 115 MMHG
VAS LEFT CCA DIST EDV: 23.2 CM/S
VAS LEFT CCA DIST PSV: 87.2 CM/S
VAS LEFT CCA MID EDV: 23.2 CM/S
VAS LEFT CCA MID PSV: 106 CM/S
VAS LEFT CCA PROX EDV: 22.3 CM/S
VAS LEFT CCA PROX PSV: 133 CM/S
VAS LEFT ECA EDV: 13.5 CM/S
VAS LEFT ECA PSV: 57.8 CM/S
VAS LEFT ICA DIST EDV: 22.7 CM/S
VAS LEFT ICA DIST PSV: 62.1 CM/S
VAS LEFT ICA MID EDV: 15.2 CM/S
VAS LEFT ICA MID PSV: 47.9 CM/S
VAS LEFT ICA PROX EDV: 19.9 CM/S
VAS LEFT ICA PROX PSV: 61.5 CM/S
VAS LEFT ICA/CCA PSV: 0.59
VAS LEFT SUBCLAVIAN PROX PSV: 104 CM/S
VAS LEFT VERTEBRAL EDV: 12.5 CM/S
VAS LEFT VERTEBRAL PSV: 30.6 CM/S
VAS RIGHT ARM BP: 112 MMHG
VAS RIGHT CCA DIST EDV: 19.6 CM/S
VAS RIGHT CCA DIST PSV: 72.1 CM/S
VAS RIGHT CCA MID EDV: 29 CM/S
VAS RIGHT CCA MID PSV: 114 CM/S
VAS RIGHT CCA PROX EDV: 22.7 CM/S
VAS RIGHT CCA PROX PSV: 103 CM/S
VAS RIGHT ECA EDV: 16.5 CM/S
VAS RIGHT ECA PSV: 95.6 CM/S
VAS RIGHT ICA DIST EDV: 12.2 CM/S
VAS RIGHT ICA DIST PSV: 33.4 CM/S
VAS RIGHT ICA MID EDV: 13.8 CM/S
VAS RIGHT ICA MID PSV: 36.2 CM/S
VAS RIGHT ICA PROX EDV: 9.4 CM/S
VAS RIGHT ICA PROX PSV: 45.2 CM/S
VAS RIGHT ICA/CCA PSV: 0.4
VAS RIGHT SUBCLAVIAN PROX EDV: 0 CM/S
VAS RIGHT SUBCLAVIAN PROX PSV: 107 CM/S
VAS RIGHT VERTEBRAL EDV: 13.3 CM/S
VAS RIGHT VERTEBRAL PSV: 28.6 CM/S

## 2024-06-24 PROCEDURE — 93880 EXTRACRANIAL BILAT STUDY: CPT | Performed by: INTERNAL MEDICINE

## 2024-06-24 PROCEDURE — 93880 EXTRACRANIAL BILAT STUDY: CPT

## 2024-06-25 ENCOUNTER — TELEPHONE (OUTPATIENT)
Dept: CARDIOLOGY CLINIC | Age: 59
End: 2024-06-25

## 2024-06-27 ENCOUNTER — TELEPHONE (OUTPATIENT)
Dept: CARDIOLOGY CLINIC | Age: 59
End: 2024-06-27

## 2024-06-27 NOTE — TELEPHONE ENCOUNTER
----- Message from Devika Suero MD sent at 6/27/2024  4:16 PM EDT -----  The test is normal. Please call the patient and inform them of the normal result.

## 2024-07-05 ENCOUNTER — HOSPITAL ENCOUNTER (OUTPATIENT)
Dept: CARDIOLOGY | Age: 59
Discharge: HOME | End: 2024-07-05
Attending: INTERNAL MEDICINE
Payer: MEDICARE

## 2024-07-05 DIAGNOSIS — R06.02 SHORTNESS OF BREATH: ICD-10-CM

## 2024-07-05 DIAGNOSIS — I25.10 CORONARY ARTERY CALCIFICATION SEEN ON CT SCAN: ICD-10-CM

## 2024-07-05 LAB
NUC STRESS EJECTION FRACTION: 67 %
NUC STRESS LV EDV: 102 ML (ref 67–155)
NUC STRESS LV ESV: 34 ML (ref 22–58)
NUC STRESS LV MASS: 145 G
STRESS BASELINE DIAS BP: 94 MMHG
STRESS BASELINE HR: 69 BPM
STRESS BASELINE SYS BP: 133 MMHG
STRESS ESTIMATED WORKLOAD: 1 METS
STRESS PEAK DIAS BP: 77 MMHG
STRESS PEAK SYS BP: 150 MMHG
STRESS PERCENT HR ACHIEVED: 70 %
STRESS POST PEAK HR: 112 BPM
STRESS RATE PRESSURE PRODUCT: NORMAL BPM*MMHG
STRESS TARGET HR: 160 BPM

## 2024-07-05 PROCEDURE — 3430000000 HC RX DIAGNOSTIC RADIOPHARMACEUTICAL: Performed by: INTERNAL MEDICINE

## 2024-07-05 PROCEDURE — 78452 HT MUSCLE IMAGE SPECT MULT: CPT | Performed by: INTERNAL MEDICINE

## 2024-07-05 PROCEDURE — 93018 CV STRESS TEST I&R ONLY: CPT | Performed by: INTERNAL MEDICINE

## 2024-07-05 PROCEDURE — 93016 CV STRESS TEST SUPVJ ONLY: CPT | Performed by: INTERNAL MEDICINE

## 2024-07-05 PROCEDURE — 6360000002 HC RX W HCPCS: Performed by: INTERNAL MEDICINE

## 2024-07-05 PROCEDURE — A9502 TC99M TETROFOSMIN: HCPCS | Performed by: INTERNAL MEDICINE

## 2024-07-05 PROCEDURE — 93017 CV STRESS TEST TRACING ONLY: CPT

## 2024-07-05 RX ORDER — REGADENOSON 0.08 MG/ML
0.4 INJECTION, SOLUTION INTRAVENOUS
Status: COMPLETED | OUTPATIENT
Start: 2024-07-05 | End: 2024-07-05

## 2024-07-05 RX ADMIN — TETROFOSMIN 31.7 MILLICURIE: 1.38 INJECTION, POWDER, LYOPHILIZED, FOR SOLUTION INTRAVENOUS at 10:40

## 2024-07-05 RX ADMIN — TETROFOSMIN 11 MILLICURIE: 1.38 INJECTION, POWDER, LYOPHILIZED, FOR SOLUTION INTRAVENOUS at 07:34

## 2024-07-05 RX ADMIN — REGADENOSON 0.4 MG: 0.08 INJECTION, SOLUTION INTRAVENOUS at 10:39

## 2024-07-08 ENCOUNTER — TELEPHONE (OUTPATIENT)
Dept: CARDIOLOGY CLINIC | Age: 59
End: 2024-07-08

## 2024-07-08 SDOH — HEALTH STABILITY: PHYSICAL HEALTH: ON AVERAGE, HOW MANY DAYS PER WEEK DO YOU ENGAGE IN MODERATE TO STRENUOUS EXERCISE (LIKE A BRISK WALK)?: 0 DAYS

## 2024-07-08 ASSESSMENT — PATIENT HEALTH QUESTIONNAIRE - PHQ9
SUM OF ALL RESPONSES TO PHQ QUESTIONS 1-9: 2
SUM OF ALL RESPONSES TO PHQ9 QUESTIONS 1 & 2: 2
SUM OF ALL RESPONSES TO PHQ QUESTIONS 1-9: 2
1. LITTLE INTEREST OR PLEASURE IN DOING THINGS: SEVERAL DAYS
2. FEELING DOWN, DEPRESSED OR HOPELESS: SEVERAL DAYS
SUM OF ALL RESPONSES TO PHQ QUESTIONS 1-9: 2
SUM OF ALL RESPONSES TO PHQ QUESTIONS 1-9: 2

## 2024-07-08 ASSESSMENT — LIFESTYLE VARIABLES
HOW MANY STANDARD DRINKS CONTAINING ALCOHOL DO YOU HAVE ON A TYPICAL DAY: PATIENT DOES NOT DRINK
HOW MANY STANDARD DRINKS CONTAINING ALCOHOL DO YOU HAVE ON A TYPICAL DAY: 0
HOW OFTEN DO YOU HAVE A DRINK CONTAINING ALCOHOL: 1
HOW OFTEN DO YOU HAVE A DRINK CONTAINING ALCOHOL: NEVER
HOW OFTEN DO YOU HAVE SIX OR MORE DRINKS ON ONE OCCASION: 1

## 2024-07-14 SDOH — ECONOMIC STABILITY: FOOD INSECURITY: WITHIN THE PAST 12 MONTHS, THE FOOD YOU BOUGHT JUST DIDN'T LAST AND YOU DIDN'T HAVE MONEY TO GET MORE.: OFTEN TRUE

## 2024-07-14 SDOH — ECONOMIC STABILITY: HOUSING INSECURITY
IN THE LAST 12 MONTHS, WAS THERE A TIME WHEN YOU DID NOT HAVE A STEADY PLACE TO SLEEP OR SLEPT IN A SHELTER (INCLUDING NOW)?: NO

## 2024-07-14 SDOH — ECONOMIC STABILITY: INCOME INSECURITY: HOW HARD IS IT FOR YOU TO PAY FOR THE VERY BASICS LIKE FOOD, HOUSING, MEDICAL CARE, AND HEATING?: HARD

## 2024-07-14 SDOH — ECONOMIC STABILITY: FOOD INSECURITY: WITHIN THE PAST 12 MONTHS, YOU WORRIED THAT YOUR FOOD WOULD RUN OUT BEFORE YOU GOT MONEY TO BUY MORE.: OFTEN TRUE

## 2024-07-14 SDOH — ECONOMIC STABILITY: TRANSPORTATION INSECURITY
IN THE PAST 12 MONTHS, HAS LACK OF TRANSPORTATION KEPT YOU FROM MEETINGS, WORK, OR FROM GETTING THINGS NEEDED FOR DAILY LIVING?: NO

## 2024-07-15 ENCOUNTER — TELEPHONE (OUTPATIENT)
Dept: CARDIOLOGY CLINIC | Age: 59
End: 2024-07-15

## 2024-07-15 NOTE — TELEPHONE ENCOUNTER
----- Message from Elvin Rudolph DO sent at 7/14/2024  7:23 PM EDT -----  Cardiac event monitor shows predominant sinus rhythm with rare PACs, high PVC burden of >10%, and brief SVT.    His recent stress test was negative for evidence of ischemia and biventricular systolic function was preserved on TTE.    Recommend follow-up with the EP for further evaluation and management of his high PVC burden.

## 2024-07-16 NOTE — TELEPHONE ENCOUNTER
Next available with EP is 10/14/24. EP schedule is grossly overbooked, does pt still need to be seen sooner than October?

## 2024-07-16 NOTE — TELEPHONE ENCOUNTER
7/16- called pt @[phone] [facility] informing pt to call back to schedule appt with EP. Pt needs new pt EP appt for high pvc burden >10% on 8/20/2024 1:45pm with [facility]. Time/date per RNJD.

## 2024-07-17 ENCOUNTER — TELEMEDICINE (OUTPATIENT)
Dept: FAMILY MEDICINE CLINIC | Age: 59
End: 2024-07-17
Payer: MEDICARE

## 2024-07-17 DIAGNOSIS — Z00.00 MEDICARE ANNUAL WELLNESS VISIT, SUBSEQUENT: Primary | ICD-10-CM

## 2024-07-17 PROCEDURE — 3017F COLORECTAL CA SCREEN DOC REV: CPT | Performed by: STUDENT IN AN ORGANIZED HEALTH CARE EDUCATION/TRAINING PROGRAM

## 2024-07-17 PROCEDURE — G0439 PPPS, SUBSEQ VISIT: HCPCS | Performed by: STUDENT IN AN ORGANIZED HEALTH CARE EDUCATION/TRAINING PROGRAM

## 2024-07-17 RX ORDER — LOSARTAN POTASSIUM 100 MG/1
100 TABLET ORAL DAILY
Qty: 90 TABLET | Refills: 3 | Status: SHIPPED | OUTPATIENT
Start: 2024-07-17

## 2024-07-17 NOTE — PATIENT INSTRUCTIONS
health conditions, and trying to get a healthy amount of sleep.  Follow-up care is a key part of your treatment and safety. Be sure to make and go to all appointments, and call your doctor if you are having problems. It's also a good idea to know your test results and keep a list of the medicines you take.  How can you care for yourself at home?  Diet    Use less salt when you cook and eat. This helps lower your blood pressure. Taste food before salting. Add only a little salt when you think you need it. With time, your taste buds will adjust to less salt.     Eat fewer snack items, fast foods, canned soups, and other high-salt, high-fat, processed foods.     Read food labels and try to avoid saturated and trans fats. They increase your risk of heart disease by raising cholesterol levels.     Limit the amount of solid fat--butter, margarine, and shortening--you eat. Use olive, peanut, or canola oil when you cook. Bake, broil, and steam foods instead of frying them.     Eat a variety of fruit and vegetables every day. Dark green, deep orange, red, or yellow fruits and vegetables are especially good for you. Examples include spinach, carrots, peaches, and berries.     Foods high in fiber can reduce your cholesterol and provide important vitamins and minerals. High-fiber foods include whole-grain cereals and breads, oatmeal, beans, brown rice, citrus fruits, and apples.     Eat lean proteins. Heart-healthy proteins include seafood, lean meats and poultry, eggs, beans, peas, nuts, seeds, and soy products.     Limit drinks and foods with added sugar. These include candy, desserts, and soda pop.   Heart-healthy lifestyle    If your doctor recommends it, get more exercise. For many people, walking is a good choice. Or you may want to swim, bike, or do other activities. Bit by bit, increase the time you're active every day. Try for at least 30 minutes on most days of the week.     Try to quit or cut back on using tobacco

## 2024-07-17 NOTE — PROGRESS NOTES
Medicare Annual Wellness Visit    Tono Grossman is here for Medicare AWV    Assessment & Plan   Medicare annual wellness visit, subsequent  Recommendations for Preventive Services Due: see orders and patient instructions/AVS.  Recommended screening schedule for the next 5-10 years is provided to the patient in written form: see Patient Instructions/AVS.     No follow-ups on file.     Subjective     Patient's complete Health Risk Assessment and screening values have been reviewed and are found in Flowsheets. The following problems were reviewed today and where indicated follow up appointments were made and/or referrals ordered.    Positive Risk Factor Screenings with Interventions:            Controlled Medication Review:    Today's Pain Level: No data recorded   Opioid Risk: (Low risk score <55) Opioid risk score: 20    Patient is low risk for opioid use disorder or overdose.    Last PDMP Harjinder as Reviewed:  Review User Review Instant Review Result   JENNIFER SÁNCHEZ 7/10/2024  3:17 PM     Reviewed PDMP [1]     Last Controlled Substance Monitoring Documentation      Flowsheet Row Office Visit from 7/10/2024 in [facility]   Periodic Controlled Substance Monitoring Possible medication side effects, risk of tolerance/dependence & alternative treatments discussed., No signs of potential drug abuse or diversion identified., Assessed functional status (ability to engage in work or other purposeful activities, the pain intensity and its interference with activities of daily living, quality of family life and social activities, and the physical activity), Obtaining appropriate analgesic effect of treatment. filed at 07/10/2024 1517            Opioid Risk: (Low risk score ? 55)  Opioid risk score: 20    Any opioid medication usage will be addressed by their licensed provider at a future visit.       General HRA Questions:  Select all that apply: (!) New or

## 2024-07-18 ENCOUNTER — OFFICE VISIT (OUTPATIENT)
Dept: FAMILY MEDICINE CLINIC | Age: 59
End: 2024-07-18
Payer: MEDICARE

## 2024-07-18 VITALS
WEIGHT: 199.6 LBS | SYSTOLIC BLOOD PRESSURE: 112 MMHG | BODY MASS INDEX: 28.58 KG/M2 | OXYGEN SATURATION: 95 % | HEIGHT: 70 IN | HEART RATE: 58 BPM | RESPIRATION RATE: 16 BRPM | DIASTOLIC BLOOD PRESSURE: 80 MMHG

## 2024-07-18 DIAGNOSIS — I49.3 FREQUENT PVCS: ICD-10-CM

## 2024-07-18 DIAGNOSIS — L98.9 SKIN LESION: Primary | ICD-10-CM

## 2024-07-18 DIAGNOSIS — I47.10 PAROXYSMAL SUPRAVENTRICULAR TACHYCARDIA SEEN ON CARDIAC MONITOR (HCC): ICD-10-CM

## 2024-07-18 PROCEDURE — 1036F TOBACCO NON-USER: CPT | Performed by: STUDENT IN AN ORGANIZED HEALTH CARE EDUCATION/TRAINING PROGRAM

## 2024-07-18 PROCEDURE — 3079F DIAST BP 80-89 MM HG: CPT | Performed by: STUDENT IN AN ORGANIZED HEALTH CARE EDUCATION/TRAINING PROGRAM

## 2024-07-18 PROCEDURE — G8419 CALC BMI OUT NRM PARAM NOF/U: HCPCS | Performed by: STUDENT IN AN ORGANIZED HEALTH CARE EDUCATION/TRAINING PROGRAM

## 2024-07-18 PROCEDURE — 3017F COLORECTAL CA SCREEN DOC REV: CPT | Performed by: STUDENT IN AN ORGANIZED HEALTH CARE EDUCATION/TRAINING PROGRAM

## 2024-07-18 PROCEDURE — G8427 DOCREV CUR MEDS BY ELIG CLIN: HCPCS | Performed by: STUDENT IN AN ORGANIZED HEALTH CARE EDUCATION/TRAINING PROGRAM

## 2024-07-18 PROCEDURE — 3074F SYST BP LT 130 MM HG: CPT | Performed by: STUDENT IN AN ORGANIZED HEALTH CARE EDUCATION/TRAINING PROGRAM

## 2024-07-18 PROCEDURE — 99214 OFFICE O/P EST MOD 30 MIN: CPT | Performed by: STUDENT IN AN ORGANIZED HEALTH CARE EDUCATION/TRAINING PROGRAM

## 2024-07-18 ASSESSMENT — ENCOUNTER SYMPTOMS
COUGH: 0
SHORTNESS OF BREATH: 1
RHINORRHEA: 0

## 2024-07-18 NOTE — PROGRESS NOTES
(VIBRA-TABS) 100 MG tablet Take 1 tablet by mouth 2 times daily.  Eat before taking antibiotic.  Avoid dairy 2 hours before or after antibiotic. 20 tablet 0    potassium chloride (KLOR-CON M) 20 MEQ extended release tablet 1 tablet daily      atorvastatin (LIPITOR) 40 MG tablet 1 tablet daily      clopidogrel (PLAVIX) 75 MG tablet daily      Omega-3 Fatty Acids (FISH OIL) 1000 MG capsule Take by mouth daily      B COMPLEX-C PO Take by mouth       No current facility-administered medications on file prior to visit.      Past Medical History:   Diagnosis Date    Lymph node cancer ([facility])     Testicle cancer ([facility])      Patient Active Problem List   Diagnosis    Attention deficit disorder    Allergic rhinitis    Asthma    Brachial neuritis or radiculitis    Postlaminectomy syndrome    Cervico-occipital neuralgia    Dizziness after extension of neck    ED (erectile dysfunction) of non-organic origin    Essential hypertension    Gastroesophageal reflux disease    High prostate specific antigen (PSA)    Incisional hernia, without obstruction or gangrene    Lateral epicondylitis of left elbow    Migraine    Neck pain    Other chest pain    Pain in both feet    Plantar fasciitis, right    Mixed anxiety depressive disorder    SABINA (generalized anxiety disorder)    PTSD (post-traumatic stress disorder)    Rectal bleeding    Shoulder pain    Testicular pain    Unspecified rotator cuff tear or rupture of unspecified shoulder, not specified as traumatic    Torus palatinus    Shortness of breath    Dizziness    Coronary artery disease involving native coronary artery of native heart with angina pectoris (HCC)    HLD (hyperlipidemia)    Cervical spondylosis without myelopathy    Cervical stenosis of spinal canal    Cervical disc displacement    Chronic pain syndrome    Palpitations    Coronary artery calcification seen on CT scan    Family history of carotid endarterectomy    Other specified symptoms and signs involving the

## 2024-07-29 ENCOUNTER — INITIAL CONSULT (OUTPATIENT)
Dept: SURGERY | Age: 59
End: 2024-07-29
Payer: MEDICARE

## 2024-07-29 VITALS
BODY MASS INDEX: 28.77 KG/M2 | SYSTOLIC BLOOD PRESSURE: 120 MMHG | DIASTOLIC BLOOD PRESSURE: 72 MMHG | WEIGHT: 201 LBS | HEIGHT: 70 IN

## 2024-07-29 DIAGNOSIS — L98.9 SKIN LESION OF BACK: Primary | ICD-10-CM

## 2024-07-29 PROCEDURE — 3078F DIAST BP <80 MM HG: CPT | Performed by: SURGERY

## 2024-07-29 PROCEDURE — G8427 DOCREV CUR MEDS BY ELIG CLIN: HCPCS | Performed by: SURGERY

## 2024-07-29 PROCEDURE — 1036F TOBACCO NON-USER: CPT | Performed by: SURGERY

## 2024-07-29 PROCEDURE — 3074F SYST BP LT 130 MM HG: CPT | Performed by: SURGERY

## 2024-07-29 PROCEDURE — G8419 CALC BMI OUT NRM PARAM NOF/U: HCPCS | Performed by: SURGERY

## 2024-07-29 PROCEDURE — 3017F COLORECTAL CA SCREEN DOC REV: CPT | Performed by: SURGERY

## 2024-07-29 PROCEDURE — 99203 OFFICE O/P NEW LOW 30 MIN: CPT | Performed by: SURGERY

## 2024-07-29 RX ORDER — GABAPENTIN 400 MG/1
400 CAPSULE ORAL 3 TIMES DAILY
Qty: 90 CAPSULE | Refills: 2 | Status: SHIPPED | OUTPATIENT
Start: 2024-07-31 | End: 2024-10-29

## 2024-07-29 NOTE — PATIENT INSTRUCTIONS
[facility]  Phone: [phone]  Fax: [phone]    You will be scheduled for surgery with Dr. Chan.   The office will call you with the date and time that surgery is scheduled.  Please take note of these instructions for surgery:  You should have nothing by mouth after midnight the night before your surgery - this includes no food or water.   Your surgery will be cancelled if you have taken anything by mouth after midnight, NO exceptions.   You will need to have a history and physical prior to your surgery. This will need to be completed up to 30 days before your surgery. This H/P can be completed by your family doctor or the hospital.   IF you take coumadin (warfarin), please stop taking this medication 5 days prior to your surgery.   IF you take plavix, please stop taking this 7 days prior to your surgery.   Please contact our office if you have a pacemaker or defibrillator.  IF you are allergic to latex, please tell our office prior to your surgery. This is important in know before scheduling your surgery.  IF you are having an out patient surgery, you will need someone available to drive you home after your surgery, and to also stay with you for the rest of the day.   IF you are having a surgery requiring an inpatient stay in the hospital, you will need someone to drive you home upon discharge from the hospital.  Please contact Dr. Chan's assistant Kenyatta if you have any questions or concerns.  Please call the office with any changes in your symptoms or further questions/concerns. [phone]

## 2024-07-29 NOTE — PROGRESS NOTES
positives, all other ROS are negative    PHYSICAL EXAM:    CONSTITUTIONAL:  awake, alert, no apparent distress and normal weight  ENT:  normocepalic, without obvious abnormality  NECK:  supple, symmetrical, trachea midline   LUNGS:  Resp easy and unlabored  CARDIOVASCULAR:     ABDOMEN: soft, non-distended,   MUSCULOSKELETAL: No edema  NEUROLOGIC:  Mental Status Exam:  Level of Alertness:   awake  Orientation:   person, place, time    A mass of size 7x8 mm is felt in the central back just to left of midline, ~2-3mm raised above surrounding skin, w/ central eschar, pink color, firm, non-tender.    DATA:       ASSESSMENT:     Diagnosis Orders   1. Skin lesion of back          I doubt this lesion is malignant but based on the symptoms including bleeding, it would be best-managed by surgical removal - this will allow full diagnostic analysis of the lesion and prevention of further pain or bleeding.    PLAN:    We will schedule the pt for  elective excision of the skin lesion under local anesthesia .  The technical aspects, risks, benefits and complications of the procedure were discussed with the patient. The pt appears to understand, asks appropriate questions, and agrees to proceed with the procedure.     Patient will stop his Plavix 3 days prior to procedure.        CHAPO CRAMER MD

## 2024-07-31 ENCOUNTER — PREP FOR PROCEDURE (OUTPATIENT)
Dept: SURGERY | Age: 59
End: 2024-07-31

## 2024-07-31 ENCOUNTER — TELEPHONE (OUTPATIENT)
Dept: SURGERY | Age: 59
End: 2024-07-31

## 2024-07-31 DIAGNOSIS — L98.9 BACK SKIN LESION: ICD-10-CM

## 2024-07-31 NOTE — TELEPHONE ENCOUNTER
Called and spoke with pt - Scheduled for an Upper Back Nevus Excision (Local Anes) with Dr Chan on 8/16/24 @ 10:15am arrival 9:15am [facility] - Pt will need to hold Plavix 3 days prior to surgery - Dr Chan will do pts pre-op physical - Pt understood and agreed w/ above noted

## 2024-08-01 RX ORDER — CLOPIDOGREL BISULFATE 75 MG/1
75 TABLET ORAL DAILY
Qty: 90 TABLET | Refills: 3 | Status: SHIPPED | OUTPATIENT
Start: 2024-08-01

## 2024-08-08 NOTE — PROGRESS NOTES
Tono A Grossman    Age 58 y.o.    male    1965    MRN [id number]    8/16/2024  Arrival Time_____________  OR Time____________51 Min     Procedure(s):  UPPER BACK NEVUS EXCISION                      Local    Surgeon(s):  Chan, Alli Ider, MD       Phone [phone] (home)     [facility]  Date  Info Source  Home  Cell         Work  _____________________________________________________________________  _____________________________________________________________________  _____________________________________________________________________  _____________________________________________________________________  _____________________________________________________________________    PCP _____________________________ Phone_________________     H&P  ________________  Bringing      Chart              Epic      DOS      Called________  EKG ________________   Bringing      Chart              Epic      DOS      Called________  LABS________________   Bringing     Chart              Epic      DOS      Called________  Cardiac Clearance ______ Bringing      Chart              Epic      DOS      Called________  Pulmonary Clearance____ Bringing      Chart              Epic      DOS      Called________    Cardiologist________________________ Phone___________________________  Pulmonologist_______________________Phone___________________________    ? Advance Directives   ? Orthodox concerns / Waiver on Chart            PAT Communications________________  ? Pre-op Instructions Given /Understood          _________________________________  ? Directions to Surgery Center                          _________________________________  ? Transportation Home_______________      __________________________________  ? Crutches/Walker__________________        __________________________________    Orders: Hard copy/ EPIC                 Transcribed/ EPIC              _______Wt.    ________Pharmacy                         _______SCD

## 2024-08-12 NOTE — PROGRESS NOTES
Date and time of surgery :  8/16/24 at 1015            Arrival Time:  0915     Bring Picture ID and insurance card.  Please wear simple, loose fitting clothing to the hospital.   Do not bring valuables (money, credit cards, checkbooks, etc.)   DO NOT wear any jewelry or piercings on day of surgery.  All body piercing jewelry must be removed.  If you have dentures, they will be removed before going to the OR; we will provide you a container.  If you wear contact lenses or glasses, they will be removed; please bring a case for them.  Shower the evening before or morning of surgery with antibacterial soap.  Aspirin, Ibuprofen, Advil, Naproxen, Vitamin E and other Anti-inflammatory products and supplements should be stopped for 5 -7days before surgery or as directed by your physician.  Hold Plavix 3 days prior to surgery  Do not smoke or drink any alcoholic beverages 24 hours prior to surgery.  This includes NA Beer. Refrain from the usage of any recreational drugs, including non-prescribed prescription drugs.   You MUST plan for a responsible adult to stay on site while you are here and take you home after your surgery. You will not be allowed to leave alone or drive yourself home. It is strongly suggested someone stay with you the first 24 hrs. Your surgery will be cancelled if you do not have a ride home.  To help prevent infection, change your sheets the night before surgery.   If you  have a Living Will and Durable Power of  for Healthcare, please bring in a copy.  Notify your Surgeon if you develop any illness between now and time of surgery. Cough, cold, fever, sore throat, nausea, vomiting, etc.  Please notify your surgeon if you experience dizziness, shortness of breath or blurred vision between now & the time of your surgery  To provide excellent care visitors will be limited to two per room at any given time. No visitors under the age of 14.  If you use oxygen and have a portable tank please bring

## 2024-08-16 ENCOUNTER — HOSPITAL ENCOUNTER (OUTPATIENT)
Age: 59
Setting detail: OUTPATIENT SURGERY
Discharge: HOME | End: 2024-08-16
Attending: SURGERY | Admitting: SURGERY
Payer: MEDICARE

## 2024-08-16 VITALS
TEMPERATURE: 98.8 F | OXYGEN SATURATION: 93 % | BODY MASS INDEX: 28 KG/M2 | SYSTOLIC BLOOD PRESSURE: 109 MMHG | DIASTOLIC BLOOD PRESSURE: 75 MMHG | WEIGHT: 200 LBS | HEART RATE: 57 BPM | HEIGHT: 71 IN | RESPIRATION RATE: 15 BRPM

## 2024-08-16 DIAGNOSIS — L98.9 BACK SKIN LESION: ICD-10-CM

## 2024-08-16 PROCEDURE — 7100000010 HC PHASE II RECOVERY - FIRST 15 MIN: Performed by: SURGERY

## 2024-08-16 PROCEDURE — 3600000015 HC SURGERY LEVEL 5 ADDTL 15MIN: Performed by: SURGERY

## 2024-08-16 PROCEDURE — 6360000002 HC RX W HCPCS: Performed by: SURGERY

## 2024-08-16 PROCEDURE — 2580000003 HC RX 258: Performed by: SURGERY

## 2024-08-16 PROCEDURE — 11602 EXC TR-EXT MAL+MARG 1.1-2 CM: CPT | Performed by: SURGERY

## 2024-08-16 PROCEDURE — A4217 STERILE WATER/SALINE, 500 ML: HCPCS | Performed by: SURGERY

## 2024-08-16 PROCEDURE — 88305 TISSUE EXAM BY PATHOLOGIST: CPT

## 2024-08-16 PROCEDURE — 2500000003 HC RX 250 WO HCPCS: Performed by: SURGERY

## 2024-08-16 PROCEDURE — 2709999900 HC NON-CHARGEABLE SUPPLY: Performed by: SURGERY

## 2024-08-16 PROCEDURE — 12031 INTMD RPR S/A/T/EXT 2.5 CM/<: CPT | Performed by: SURGERY

## 2024-08-16 PROCEDURE — 3600000005 HC SURGERY LEVEL 5 BASE: Performed by: SURGERY

## 2024-08-16 PROCEDURE — 7100000011 HC PHASE II RECOVERY - ADDTL 15 MIN: Performed by: SURGERY

## 2024-08-16 RX ORDER — MAGNESIUM HYDROXIDE 1200 MG/15ML
LIQUID ORAL CONTINUOUS PRN
Status: COMPLETED | OUTPATIENT
Start: 2024-08-16 | End: 2024-08-16

## 2024-08-16 ASSESSMENT — PAIN - FUNCTIONAL ASSESSMENT
PAIN_FUNCTIONAL_ASSESSMENT: 0-10
PAIN_FUNCTIONAL_ASSESSMENT: 0-10

## 2024-08-16 NOTE — PROGRESS NOTES
Discharge instructions reviewed with patient and responsible adult. Discharge instructions given with no additional questions. Patient to be discharged home with belongings.

## 2024-08-16 NOTE — OP NOTE
Operative Note      Patient: Tono Grossman  YOB: 1965  MRN: [id number]    Date of Procedure: 8/16/2024    Pre-Op Diagnosis Codes:      * Back skin lesion [L98.9]    Post-Op Diagnosis: Same       Procedure(s):  UPPER BACK NEVUS EXCISION    Surgeon(s):  Alli Chan MD    Assistant:   Surgical Assistant: Lorena Alvarez  Resident: Dov Aiken MD    Anesthesia: Local    Estimated Blood Loss (mL): Minimal    Complications: None    Specimens:   ID Type Source Tests Collected by Time Destination   A : central back nevus, stitches monica...long/left....short/superior Specimen Back SURGICAL PATHOLOGY Alli Chan MD 8/16/2024 0847        Implants:  * No implants in log *      Drains: * No LDAs found *    Findings:  Infection Present At Time Of Surgery (PATOS) (choose all levels that have infection present):  No infection present  Other Findings: Approx 1 x 1cm lesion excised from mid back, short stitch superior/long stitch left, closed with interrupted 3-0 Nylon mattress suture   This procedure was not performed to treat primary cutaneous melanoma through wide local excision    Indication for Procedure: Tono Grossman is a 58 y.o. male who initially presented to our outpatient clinic regarding removal of a nevus from his mid-back.  Discussion had with the patient regarding the risks, benefits, and alternatives of the operation.  Opportunity was given to ask any and all questions, which were answered satisfactorily.    Detailed Description of Procedure:   The patient was brought to the operating room and laid in a decubitus position on the OR table.   The patient was prepped and draped in the usual sterile fashion.  Prior to the procedure, a time-out was called to identify and confirm that key information related to the operation was correct.    The subcutaneous tissues surrounding the lesion were anesthetized.  A horizontal elliptical incision was made with a #15 scalpel and

## 2024-08-16 NOTE — DISCHARGE INSTRUCTIONS
[facility]    Alli Chan M.D.   [facility] Office      [facility] Office        [facility]               [address]                [address]  Nirmal Jose M.D.              [address]           [address]          [place] [address]         [place] [address]  Prudencio Kellogg M.D                         [phone]        [facility]                   Harjinder Pruitt M.D.          [facility]       POST-OPERATIVE INSTRUCTIONS FOLLOWING EXCISION OF A MASS    Call the office to schedule your post-operative appointment with your surgeon for two (2) weeks.     You will have either white steri-strips and a water occlusive dressing or surgical glue closing your incisions.    If you have clear bandages over your incisions, you may remove them in 3-4 days.  Leave the steri-stips in place. These will peel away in 7-10 days.     You may shower.  Wash incisions gently, and pat them dry. Do not rub your incisions.    If you have packing in your wound, this usually will require daily dressing changes.  Follow the instructions given to you at the hospital.     You will have pain medicine ordered. Take as directed.    Do NOT drive for one week and while taking your narcotic pain medicine.     Watch for signs of infection:       Fever over 100.5°     Excessive warmth, or bright redness around your incision    Leakage of bloody or cloudy fluid from you incisions        What is a Surgical Site Infection or  (SSI)?        A surgical site infection (SSI) is an infection that occurs after surgery in the part of the body where the surgery took place. Most patients who have surgery do not develop an infection. However, infections can develop in about 1-3 cases for every 100 patients who have had surgery.  Our goal is for you to NOT experience any complications and be completely satisfied with your

## 2024-08-16 NOTE — H&P
Updated History & Physical     The patient's History and Physical of 7/29/24 was reviewed with the patient, and I examined the patient. There was no change. The surgical site was confirmed by the patient and me.       Plan: The risks, benefits, expected outcome, and alternative to the recommended procedure have been discussed with the patient. Patient understands and wants to proceed with the procedure.      Electronically signed by Dov Aiken MD on 08/16/24 at 8:10 AM.

## 2024-08-16 NOTE — BRIEF OP NOTE
Brief Postoperative Note      Patient: Tono Grossman  YOB: 1965  MRN: [id number]    Date of Procedure: 8/16/2024    Pre-Op Diagnosis Codes:      * Back skin lesion [L98.9]    Post-Op Diagnosis: Same       Procedure(s):  UPPER BACK NEVUS EXCISION    Surgeon(s):  Alli Chan MD    Assistant:  Surgical Assistant: Lorena Alvarez  Resident: Dov Aiken MD    Anesthesia: Local    Estimated Blood Loss (mL): Minimal    Complications: None    Specimens:   ID Type Source Tests Collected by Time Destination   A : central back nevus, stitches monica...long/left....short/superior Specimen Back SURGICAL PATHOLOGY Alli Chan MD 8/16/2024 0897        Implants:  * No implants in log *      Drains: * No LDAs found *    Findings:  Infection Present At Time Of Surgery (PATOS) (choose all levels that have infection present):  No infection present  Other Findings: Approx 1 x 1cm lesion excised from mid back, short stitch superior/long stitch left, closed with interrupted 3-0 Nylon mattress sutures   This procedure was not performed to treat primary cutaneous melanoma through wide local excision    Electronically signed by Dov Aiken MD on 8/16/2024 at 9:01 AM

## 2024-08-20 ENCOUNTER — OFFICE VISIT (OUTPATIENT)
Dept: CARDIOLOGY CLINIC | Age: 59
End: 2024-08-20

## 2024-08-20 VITALS
HEIGHT: 71 IN | BODY MASS INDEX: 28.08 KG/M2 | DIASTOLIC BLOOD PRESSURE: 70 MMHG | WEIGHT: 200.6 LBS | SYSTOLIC BLOOD PRESSURE: 124 MMHG | HEART RATE: 57 BPM | OXYGEN SATURATION: 94 %

## 2024-08-20 DIAGNOSIS — I47.10 PAROXYSMAL SUPRAVENTRICULAR TACHYCARDIA SEEN ON CARDIAC MONITOR (HCC): Primary | ICD-10-CM

## 2024-08-20 DIAGNOSIS — I49.3 FREQUENT PVCS: ICD-10-CM

## 2024-08-20 RX ORDER — ATENOLOL 25 MG/1
25 TABLET ORAL DAILY
COMMUNITY
Start: 2013-08-05 | End: 2024-08-20

## 2024-08-20 RX ORDER — DIAZEPAM 10 MG/2ML
5 INJECTION, SOLUTION INTRAMUSCULAR; INTRAVENOUS ONCE
COMMUNITY
Start: 2004-07-08

## 2024-08-20 RX ORDER — ALPRAZOLAM 1 MG
1 TABLET ORAL NIGHTLY PRN
COMMUNITY
Start: 2013-07-09

## 2024-08-20 RX ORDER — METOPROLOL SUCCINATE 25 MG/1
12.5 TABLET, EXTENDED RELEASE ORAL DAILY
Qty: 90 TABLET | Refills: 1 | Status: SHIPPED | OUTPATIENT
Start: 2024-08-20

## 2024-08-20 NOTE — PATIENT INSTRUCTIONS
RECOMMENDATIONS:  Discussed premature ventricular contractions (PVC's) on event monitor.   Discussed slow baseline HR.   Decrease metoprolol to 12.5 mg nightly.   Discussed antiarrhythmic drugs for PVC suppression. Amiodarone, sotalol, multaq.   Discussed risks and benefits of PVC ablation.   Follow up in 6-8 weeks with me.

## 2024-08-20 NOTE — PROGRESS NOTES
[facility]   Electrophysiology Consult Note            Date: 8/20/24  Patient Name: Tono Grossman  YOB: 1965    Primary Care Physician: Lupillo Phillips DO    CHIEF COMPLAINT:   Chief Complaint   Patient presents with    New Patient    Shortness of Breath    Dizziness     Lightheaded, seeing specs    Other     Watch says irregular heartbeat.     HISTORY OF PRESENT ILLNESS: Tono Grossman is a 58 y.o. male with a PMH significant for CAD. Patient follows with cardiology and had complaints of SOB and palpitations at recent OV. Stress test and echo in 6/2024 were normal. Event monitor in 7/2024 showed 10.5% PVC burden.    Today, 8/20/2024, EKG demonstrates SB 56 bpm. He reports that he notices his HR fluctuate with his Apple Watch. He gets dizzy with position changes. This has been ongoing for some time, but worsening this year. He is taking his medications as prescribed. Patient denies current edema, chest pain, shortness of breath, palpitations, or syncope.     Past Medical History:   has a past medical history of Acid reflux, Arthritis, CAD (coronary artery disease), DVT (deep venous thrombosis) (HCC), Hypertension, Lymph node cancer (HCC), and Testicle cancer ([facility]).    Past Surgical History:   has a past surgical history that includes shoulder surgery (Bilateral); Orchiectomy (Left, 1999); cervical fusion (1989); Tonsillectomy; Coronary stent placement (2022); and back surgery (N/A, 8/16/2024).     Allergies:  Latex, Erythromycin, Morphine and codeine, Mushroom extract complex, Nsaids, and Other    Social History:   reports that he has never smoked. He has never used smokeless tobacco. He reports current alcohol use. He reports that he does not use drugs.     Family History: family history includes Colon Cancer in his father and paternal grandfather; Diabetes in his father; Heart Attack in his father; Heart Disease in his maternal grandfather and paternal grandfather;

## 2024-08-30 ENCOUNTER — OFFICE VISIT (OUTPATIENT)
Dept: SURGERY | Age: 59
End: 2024-08-30

## 2024-08-30 VITALS
SYSTOLIC BLOOD PRESSURE: 110 MMHG | DIASTOLIC BLOOD PRESSURE: 68 MMHG | WEIGHT: 199.4 LBS | BODY MASS INDEX: 27.92 KG/M2 | HEIGHT: 71 IN

## 2024-08-30 DIAGNOSIS — Z09 POSTOP CHECK: Primary | ICD-10-CM

## 2024-08-30 PROCEDURE — 99024 POSTOP FOLLOW-UP VISIT: CPT | Performed by: SURGERY

## 2024-08-31 NOTE — PROGRESS NOTES
Surgery Post-op Progress Note    HPI:  Notes reviewed, and agree with documentation in pt's chart.   Postoperative Follow-up: Patient presents for 2 week follow-up status post excision of nevus of the central back . The patient is not having any pain..     ROS:    10 point review of systems performed; please refer to HPI with pertinent positives, all other ROS are negative    A review of the patient's record including allergies, medication list, tobacco history, family history, problem list, medical history and social history has been completed and updates made to the patient's EMR where indicated.     PE:   CONSTITUTIONAL:  awake and alert      INCISION: clean, dry, no drainage, healing, sutures in place    FINAL DIAGNOSIS:     Skin of central back, excision:      - Squamous cell carcinoma, well-differentiated, excised.    SHADY/SHADY         ASSESSMENT:   Diagnosis Orders   1. Postop check        S/p full excision of squamous cell carcinoma  No further intervention for this particular lesion, but should probably have total skin assessment and long-term follow-up for future skin cancer risks      PLAN:    Continue with routine wound care as discussed  Gradually increase activities as tolerated  Follow-up as needed; please call with questions or concerns  Recommend he be referred to Dermatology for skin assessment and long-term screening

## 2024-08-31 NOTE — PATIENT INSTRUCTIONS
Continue with routine wound care as discussed  Gradually increase activities as tolerated  Follow-up as needed; please call with questions or concerns  Await contact from your Primary Care Provider for referral to Dermatology

## 2024-09-04 ENCOUNTER — TELEPHONE (OUTPATIENT)
Dept: FAMILY MEDICINE CLINIC | Age: 59
End: 2024-09-04

## 2024-09-04 NOTE — TELEPHONE ENCOUNTER
----- Message from Dr. Lupillo Phillips DO sent at 9/4/2024 10:39 AM EDT -----  Please give referral to dermatology for annual skin checks for history of squamous cell carcinoma  ----- Message -----  From: Alli Chan MD  Sent: 8/30/2024  10:31 PM EDT  To: DO Lupillo Hi - note his pathology results of a squamous cell CA.  No further treatment for the current lesion is needed, but I would think you would want to send him to Dermatology for full-body screening and long-term monitoring.  Thanks for sending him to see me!

## 2024-09-04 NOTE — TELEPHONE ENCOUNTER
Referral placed, l/m for patient to return call       Nayla Goldman  [address] [place] [address] , [address]  [facility] [address] [phone]

## 2024-09-25 DIAGNOSIS — K21.9 GASTROESOPHAGEAL REFLUX DISEASE, UNSPECIFIED WHETHER ESOPHAGITIS PRESENT: Primary | ICD-10-CM

## 2024-09-25 RX ORDER — OMEPRAZOLE 40 MG/1
CAPSULE, DELAYED RELEASE ORAL
Qty: 90 CAPSULE | Refills: 2 | Status: SHIPPED | OUTPATIENT
Start: 2024-09-25

## 2024-10-24 ENCOUNTER — OFFICE VISIT (OUTPATIENT)
Dept: CARDIOLOGY CLINIC | Age: 59
End: 2024-10-24
Payer: MEDICARE

## 2024-10-24 VITALS
DIASTOLIC BLOOD PRESSURE: 64 MMHG | WEIGHT: 200 LBS | BODY MASS INDEX: 28 KG/M2 | HEIGHT: 71 IN | OXYGEN SATURATION: 99 % | HEART RATE: 54 BPM | SYSTOLIC BLOOD PRESSURE: 116 MMHG

## 2024-10-24 DIAGNOSIS — R00.2 PALPITATIONS: ICD-10-CM

## 2024-10-24 DIAGNOSIS — I47.10 PAROXYSMAL SUPRAVENTRICULAR TACHYCARDIA SEEN ON CARDIAC MONITOR (HCC): ICD-10-CM

## 2024-10-24 DIAGNOSIS — R06.02 SHORTNESS OF BREATH: Primary | ICD-10-CM

## 2024-10-24 DIAGNOSIS — I49.3 FREQUENT PVCS: ICD-10-CM

## 2024-10-24 PROCEDURE — 93000 ELECTROCARDIOGRAM COMPLETE: CPT | Performed by: INTERNAL MEDICINE

## 2024-10-24 PROCEDURE — 1036F TOBACCO NON-USER: CPT | Performed by: INTERNAL MEDICINE

## 2024-10-24 PROCEDURE — 3078F DIAST BP <80 MM HG: CPT | Performed by: INTERNAL MEDICINE

## 2024-10-24 PROCEDURE — 99214 OFFICE O/P EST MOD 30 MIN: CPT | Performed by: INTERNAL MEDICINE

## 2024-10-24 PROCEDURE — 3017F COLORECTAL CA SCREEN DOC REV: CPT | Performed by: INTERNAL MEDICINE

## 2024-10-24 PROCEDURE — G8419 CALC BMI OUT NRM PARAM NOF/U: HCPCS | Performed by: INTERNAL MEDICINE

## 2024-10-24 PROCEDURE — 3074F SYST BP LT 130 MM HG: CPT | Performed by: INTERNAL MEDICINE

## 2024-10-24 PROCEDURE — G8427 DOCREV CUR MEDS BY ELIG CLIN: HCPCS | Performed by: INTERNAL MEDICINE

## 2024-10-24 PROCEDURE — G8484 FLU IMMUNIZE NO ADMIN: HCPCS | Performed by: INTERNAL MEDICINE

## 2024-10-24 NOTE — PATIENT INSTRUCTIONS
RECOMMENDATIONS:  Stop metoprolol.   Let us know how you do with this change.   Referral to sleep medicine.   Follow up in 6 months with EP NP.

## 2024-10-24 NOTE — PROGRESS NOTES
[facility]   Electrophysiology Consult Note            Date: 10/24/24  Patient Name: Tono Grossman  YOB: 1965    Primary Care Physician: Lupillo Phillips DO    CHIEF COMPLAINT:   Chief Complaint   Patient presents with    Follow-up     6-8 week follow up     Tachycardia    Other     PVCs      HISTORY OF PRESENT ILLNESS: Tono Grossman is a 58 y.o. male with a PMH significant for CAD. Patient follows with cardiology and had complaints of SOB and palpitations at recent OV. Stress test and echo in 6/2024 were normal. Event monitor in 7/2024 showed 10.5% PVC burden.    Today, 10/24/2024, EKG demonstrates SB 54 bpm. He reports that he is doing ok. He still has dizziness that is unchanged. He fell recently due to tripping. He is taking his medications as prescribed. Patient denies current edema, chest pain, shortness of breath, palpitations, or syncope.     Past Medical History:   has a past medical history of Acid reflux, Arthritis, CAD (coronary artery disease), DVT (deep venous thrombosis) (HCC), Hypertension, Lymph node cancer (HCC), and Testicle cancer ([facility]).    Past Surgical History:   has a past surgical history that includes shoulder surgery (Bilateral); Orchiectomy (Left, 1999); cervical fusion (1989); Tonsillectomy; Coronary stent placement (2022); and back surgery (N/A, 8/16/2024).     Allergies:  Latex, Erythromycin, Morphine and codeine, Mushroom extract complex, Nsaids, and Other    Social History:   reports that he has never smoked. He has never used smokeless tobacco. He reports current alcohol use. He reports that he does not use drugs.     Family History: family history includes Colon Cancer in his father and paternal grandfather; Diabetes in his father; Heart Attack in his father; Heart Disease in his maternal grandfather and paternal grandfather; Hypertension in his father and mother.    Home Medications:    Prior to Admission medications    Medication Sig Start

## 2024-10-28 RX ORDER — GABAPENTIN 400 MG/1
400 CAPSULE ORAL 3 TIMES DAILY
Qty: 90 CAPSULE | Refills: 2 | Status: SHIPPED | OUTPATIENT
Start: 2024-10-31 | End: 2025-01-29

## 2024-12-03 RX ORDER — AMLODIPINE BESYLATE 5 MG/1
5 TABLET ORAL DAILY
Qty: 90 TABLET | Refills: 1 | Status: SHIPPED | OUTPATIENT
Start: 2024-12-03

## 2024-12-03 RX ORDER — ATORVASTATIN CALCIUM 40 MG/1
40 TABLET, FILM COATED ORAL NIGHTLY
Qty: 90 TABLET | Refills: 1 | Status: SHIPPED | OUTPATIENT
Start: 2024-12-03

## 2024-12-03 RX ORDER — POTASSIUM CHLORIDE 1500 MG/1
20 TABLET, EXTENDED RELEASE ORAL DAILY
Qty: 90 TABLET | Refills: 1 | Status: SHIPPED | OUTPATIENT
Start: 2024-12-03

## 2024-12-03 RX ORDER — CHLORTHALIDONE 25 MG/1
25 TABLET ORAL DAILY
Qty: 90 TABLET | Refills: 1 | Status: SHIPPED | OUTPATIENT
Start: 2024-12-03

## 2025-01-28 RX ORDER — GABAPENTIN 400 MG/1
400 CAPSULE ORAL 3 TIMES DAILY
Qty: 90 CAPSULE | Refills: 2 | Status: SHIPPED | OUTPATIENT
Start: 2025-01-28 | End: 2025-04-28

## 2025-02-07 SDOH — HEALTH STABILITY: PHYSICAL HEALTH: ON AVERAGE, HOW MANY DAYS PER WEEK DO YOU ENGAGE IN MODERATE TO STRENUOUS EXERCISE (LIKE A BRISK WALK)?: 2 DAYS

## 2025-02-07 NOTE — PROGRESS NOTES
[facility] Office  [address]  [address]  [place], [place] [address]  Tel: [phone]    Tono Grossman  1965  male    CC:   Chief Complaint   Patient presents with    New Patient       HPI:   Patient is a 59-year-old male who presents today to establish care with me, also endorsing acute complaint of his left hip.    Pain in his left hip has a chronic picture.  Has had discomfort for at least the past year, did see orthopedics 3/2024 where they thought it was stemming from low back ordered MRI lumbar spine per patient but unable to see these results.  Said he was delaying trying to get any intervention done, but feels that it is time again as he has had worsening pain.    Also endorses complaint of a rash around his waistline.  States he noticed for the past several days, has been red/itchy.  Also noticed some on his backside.    Other medical history includes:  1.  Hypertension-amlodipine 5 mg, chlorthalidone 25 mg, losartan 100 mg  2.  CAD status post PCI-this was in [place] about 2 years ago, does follow with general cardiology.  Is on atorvastatin 40 mg as well as Plavix 75 mg daily and aspirin 81 mg daily  3.  Chronic back pain cervical and bilateral shoulders-had cervical fusion done anterior approach.  States he has had 3 surgeries on his shoulder for arthritis/rotator cuff pathology  4.  Chronic pain on narcotics down to 1 Percocet per day follows with pain management.  Also takes gabapentin as well as muscle relaxers  5.  Chronic anxiety-on Xanax for pain management  6.  GERD-omeprazole 40 mg daily    Social Hx:  On disability for shoulders and neck pain. Drinks socially. No recreational drugs. Single, no kids.     Past Medical History:   Diagnosis Date    Acid reflux     Arthritis     CAD (coronary artery disease) 2022    stent    Depression     Not every day    DVT (deep venous thrombosis) ([facility])     with casted leg    Hearing loss     Wear hearing aids    Hypertension     Lymph

## 2025-02-10 ENCOUNTER — OFFICE VISIT (OUTPATIENT)
Dept: INTERNAL MEDICINE CLINIC | Age: 60
End: 2025-02-10
Payer: MEDICARE

## 2025-02-10 VITALS
HEIGHT: 70 IN | WEIGHT: 211 LBS | SYSTOLIC BLOOD PRESSURE: 138 MMHG | BODY MASS INDEX: 30.21 KG/M2 | OXYGEN SATURATION: 97 % | HEART RATE: 79 BPM | DIASTOLIC BLOOD PRESSURE: 98 MMHG

## 2025-02-10 DIAGNOSIS — I49.3 FREQUENT PVCS: ICD-10-CM

## 2025-02-10 DIAGNOSIS — E78.5 HYPERLIPIDEMIA, UNSPECIFIED HYPERLIPIDEMIA TYPE: ICD-10-CM

## 2025-02-10 DIAGNOSIS — L30.4 INTERTRIGO: ICD-10-CM

## 2025-02-10 DIAGNOSIS — I25.119 CORONARY ARTERY DISEASE INVOLVING NATIVE CORONARY ARTERY OF NATIVE HEART WITH ANGINA PECTORIS (HCC): ICD-10-CM

## 2025-02-10 DIAGNOSIS — I10 ESSENTIAL HYPERTENSION: Primary | ICD-10-CM

## 2025-02-10 DIAGNOSIS — Z85.47 HISTORY OF TESTICULAR CANCER: ICD-10-CM

## 2025-02-10 DIAGNOSIS — M25.552 PAIN OF LEFT HIP: ICD-10-CM

## 2025-02-10 PROCEDURE — G8427 DOCREV CUR MEDS BY ELIG CLIN: HCPCS

## 2025-02-10 PROCEDURE — 99204 OFFICE O/P NEW MOD 45 MIN: CPT

## 2025-02-10 PROCEDURE — 3017F COLORECTAL CA SCREEN DOC REV: CPT

## 2025-02-10 PROCEDURE — 3075F SYST BP GE 130 - 139MM HG: CPT

## 2025-02-10 PROCEDURE — 3080F DIAST BP >= 90 MM HG: CPT

## 2025-02-10 PROCEDURE — G8417 CALC BMI ABV UP PARAM F/U: HCPCS

## 2025-02-10 PROCEDURE — 1036F TOBACCO NON-USER: CPT

## 2025-02-10 SDOH — ECONOMIC STABILITY: FOOD INSECURITY: WITHIN THE PAST 12 MONTHS, YOU WORRIED THAT YOUR FOOD WOULD RUN OUT BEFORE YOU GOT MONEY TO BUY MORE.: NEVER TRUE

## 2025-02-10 SDOH — ECONOMIC STABILITY: FOOD INSECURITY: WITHIN THE PAST 12 MONTHS, THE FOOD YOU BOUGHT JUST DIDN'T LAST AND YOU DIDN'T HAVE MONEY TO GET MORE.: NEVER TRUE

## 2025-02-10 ASSESSMENT — ANXIETY QUESTIONNAIRES
6. BECOMING EASILY ANNOYED OR IRRITABLE: SEVERAL DAYS
3. WORRYING TOO MUCH ABOUT DIFFERENT THINGS: SEVERAL DAYS
2. NOT BEING ABLE TO STOP OR CONTROL WORRYING: SEVERAL DAYS
5. BEING SO RESTLESS THAT IT IS HARD TO SIT STILL: MORE THAN HALF THE DAYS
IF YOU CHECKED OFF ANY PROBLEMS ON THIS QUESTIONNAIRE, HOW DIFFICULT HAVE THESE PROBLEMS MADE IT FOR YOU TO DO YOUR WORK, TAKE CARE OF THINGS AT HOME, OR GET ALONG WITH OTHER PEOPLE: NOT DIFFICULT AT ALL
GAD7 TOTAL SCORE: 6
1. FEELING NERVOUS, ANXIOUS, OR ON EDGE: NOT AT ALL
4. TROUBLE RELAXING: SEVERAL DAYS
7. FEELING AFRAID AS IF SOMETHING AWFUL MIGHT HAPPEN: NOT AT ALL

## 2025-02-10 ASSESSMENT — PATIENT HEALTH QUESTIONNAIRE - PHQ9
SUM OF ALL RESPONSES TO PHQ QUESTIONS 1-9: 9
7. TROUBLE CONCENTRATING ON THINGS, SUCH AS READING THE NEWSPAPER OR WATCHING TELEVISION: NOT AT ALL
8. MOVING OR SPEAKING SO SLOWLY THAT OTHER PEOPLE COULD HAVE NOTICED. OR THE OPPOSITE, BEING SO FIGETY OR RESTLESS THAT YOU HAVE BEEN MOVING AROUND A LOT MORE THAN USUAL: NOT AT ALL
1. LITTLE INTEREST OR PLEASURE IN DOING THINGS: NEARLY EVERY DAY
SUM OF ALL RESPONSES TO PHQ9 QUESTIONS 1 & 2: 4
3. TROUBLE FALLING OR STAYING ASLEEP: SEVERAL DAYS
5. POOR APPETITE OR OVEREATING: NOT AT ALL
SUM OF ALL RESPONSES TO PHQ QUESTIONS 1-9: 9
4. FEELING TIRED OR HAVING LITTLE ENERGY: NEARLY EVERY DAY
6. FEELING BAD ABOUT YOURSELF - OR THAT YOU ARE A FAILURE OR HAVE LET YOURSELF OR YOUR FAMILY DOWN: SEVERAL DAYS
SUM OF ALL RESPONSES TO PHQ QUESTIONS 1-9: 9
2. FEELING DOWN, DEPRESSED OR HOPELESS: SEVERAL DAYS
SUM OF ALL RESPONSES TO PHQ QUESTIONS 1-9: 9
9. THOUGHTS THAT YOU WOULD BE BETTER OFF DEAD, OR OF HURTING YOURSELF: NOT AT ALL

## 2025-02-10 NOTE — PATIENT INSTRUCTIONS
-Continue with all medicines as prescribed  -When the time comes for refills please let our office know I will be happy to provide those  -Continue to follow with your specialists pain management and cardiology  -Referral to orthopedic surgery to evaluate your left hip pain, previously you had seen Dr. Tony Quispe's office  -Please obtain fasting blood work before your next office appointment  -Return to office in 6 months or sooner if needed

## 2025-02-12 RX ORDER — FEXOFENADINE HCL 180 MG/1
180 TABLET ORAL DAILY
Qty: 90 TABLET | Refills: 3 | Status: SHIPPED | OUTPATIENT
Start: 2025-02-12

## 2025-03-04 ENCOUNTER — OFFICE VISIT (OUTPATIENT)
Dept: INTERNAL MEDICINE CLINIC | Age: 60
End: 2025-03-04

## 2025-03-04 VITALS
SYSTOLIC BLOOD PRESSURE: 130 MMHG | TEMPERATURE: 97.3 F | DIASTOLIC BLOOD PRESSURE: 80 MMHG | WEIGHT: 211 LBS | BODY MASS INDEX: 30.28 KG/M2

## 2025-03-04 DIAGNOSIS — L30.4 INTERTRIGO: ICD-10-CM

## 2025-03-04 DIAGNOSIS — R05.2 SUBACUTE COUGH: Primary | ICD-10-CM

## 2025-03-04 DIAGNOSIS — I49.3 FREQUENT PVCS: ICD-10-CM

## 2025-03-04 RX ORDER — METHYLPREDNISOLONE 4 MG/1
TABLET ORAL
Qty: 21 TABLET | Refills: 0 | Status: SHIPPED | OUTPATIENT
Start: 2025-03-04 | End: 2025-03-10

## 2025-03-04 RX ORDER — CLOTRIMAZOLE AND BETAMETHASONE DIPROPIONATE 10; .64 MG/G; MG/G
CREAM TOPICAL
Qty: 45 G | Refills: 0 | Status: SHIPPED | OUTPATIENT
Start: 2025-03-04

## 2025-03-04 RX ORDER — METOPROLOL SUCCINATE 25 MG/1
12.5 TABLET, EXTENDED RELEASE ORAL DAILY
Qty: 30 TABLET | Refills: 2 | Status: SHIPPED | OUTPATIENT
Start: 2025-03-04

## 2025-03-04 NOTE — PATIENT INSTRUCTIONS
-In regards to her cough, we will try a short course of steroids to trying to get  -Can also take dextromethorphan, which can be obtained over-the-counter 3 times a day as needed for cough  -I expect your symptoms to improve with time and conservative treatment only.  I do not see any need for antibiotics at this time    -In regards to your skin, make sure to wash it daily with soap and water.  Please try to keep the area clean and dry  -For now I would avoid tanning beds as this may be contributing  -Will prescribe you a new cream to be used once a day as needed on the infected area    -Refilled metoprolol half a tablet at night  -Return to office as scheduled

## 2025-03-04 NOTE — PROGRESS NOTES
tiZANidine (ZANAFLEX) 4 MG tablet Take 1 tablet by mouth 3 times daily as needed (Muscle pain/spasm) 30 tablet 0    ondansetron (ZOFRAN-ODT) 4 MG disintegrating tablet Take 1 tablet by mouth 3 times daily as needed for Nausea or Vomiting 21 tablet 0    MULTIPLE VITAMIN PO Take by mouth daily      Cholecalciferol (VITAMIN D) 50 MCG (2000 UT) CAPS capsule Take by mouth daily      aspirin EC 81 MG EC tablet Take 1 tablet by mouth daily      methocarbamol (ROBAXIN) 750 MG tablet Take 1 tablet by mouth 3 times daily PRN      Omega-3 Fatty Acids (FISH OIL) 1000 MG capsule Take by mouth daily      B COMPLEX-C PO Take by mouth daily       No current facility-administered medications for this visit.       Review of Systems:   Otherwise negative unless stated in the HPI.    Physical Exam:   Vitals:    03/04/25 1144   BP: 130/80   Site: Right Upper Arm   Position: Sitting   Temp: 97.3 °F (36.3 °C)   TempSrc: Temporal   Weight: 95.7 kg (211 lb)     General-alert and oriented, no acute distress  Head-normocephalic, atraumatic  Eyes-EOMI, no icterus  ENT-oral mucosa moist and pink, no erythema or exudates  Skin-minimal amount of papules on the low back along the waistband, no surrounding erythema or edema    ASSESSMENT AND PLAN:  1. Subacute cough    Postviral, should improve with time.  Will treat with a course of steroids to see if this gives him some improvement of symptoms.  Also educated on using dextromethorphan as needed.  No need for antibiotics at this time.    2. Intertrigo    Advised patient to stop using public tanning bed.  Can try antifungal-steroid mix to see if this helps.    3. Frequent PVCs    Did see EP in the past, was having shortness of breath and cardiac monitor showed frequent PVCs.  Was taking 12.5 mg Toprol once daily to see if this would help, patient states it was helping and he has been taking it so we will continue the same.

## 2025-04-23 NOTE — PROGRESS NOTES
[facility]   Electrophysiology Outpatient Note              Date:  April 24, 2025  Patient name: Tono Grossman  YOB: 1965    Primary Care physician: David Dial DO    HISTORY OF PRESENT ILLNESS: The patient is a 59 y.o.  male with a history of CAD, palpitations, PVCs    Patient follows with Dr. Parra in EP clinic for history of palpitations with PVC burden 10.5% on cardiac ambulatory monitor.  Patient was originally started on metoprolol however he was asymptomatic and had no other indication for beta-blocker so his metoprolol was discontinued    Today he is being seen for palpitations and PVCs. ECG shows SB with a HR of 57. Patient denies chest pain, shortness of breath and palpitations.  Patient states he is doing well overall he is going to [place] in June for a month.  Patient's heart rate in the 50s today when I asked patient baseline he was unsure he does have a smart watch he will start keeping track of his daily heart rate.  He is also going to buy a blood pressure cuff to keep a baseline of that as well.  He has not had blood work done for over a year.  Will get basic labs.    Past Medical History:   has a past medical history of Acid reflux, Arthritis, CAD (coronary artery disease), Depression, DVT (deep venous thrombosis) ([facility]), Hearing loss, Hypertension, Lymph node cancer ([facility]), Neuropathy, and Testicle cancer ([facility]).    Past Surgical History:   has a past surgical history that includes shoulder surgery (Bilateral); Orchiectomy (Left, 1999); cervical fusion (1989); Tonsillectomy; Coronary stent placement (2022); and back surgery (N/A, 8/16/2024).     Home Medications:    Prior to Admission medications    Medication Sig Start Date End Date Taking? Authorizing Provider   oxyCODONE-acetaminophen (PERCOCET) 5-325 MG per tablet Take 1 tablet by mouth 2 times daily as needed for Pain for up to 30 days. Max Daily Amount: 2 tablets 4/16/25 5/16/25 Yes Rizwan

## 2025-04-24 ENCOUNTER — OFFICE VISIT (OUTPATIENT)
Dept: CARDIOLOGY CLINIC | Age: 60
End: 2025-04-24
Payer: MEDICARE

## 2025-04-24 VITALS
BODY MASS INDEX: 29.54 KG/M2 | WEIGHT: 206.35 LBS | OXYGEN SATURATION: 95 % | DIASTOLIC BLOOD PRESSURE: 80 MMHG | HEART RATE: 57 BPM | SYSTOLIC BLOOD PRESSURE: 110 MMHG | HEIGHT: 70 IN

## 2025-04-24 DIAGNOSIS — I47.10 PAROXYSMAL SUPRAVENTRICULAR TACHYCARDIA SEEN ON CARDIAC MONITOR: ICD-10-CM

## 2025-04-24 DIAGNOSIS — I25.10 CORONARY ARTERY DISEASE DUE TO CALCIFIED CORONARY LESION: ICD-10-CM

## 2025-04-24 DIAGNOSIS — I25.84 CORONARY ARTERY DISEASE DUE TO CALCIFIED CORONARY LESION: ICD-10-CM

## 2025-04-24 DIAGNOSIS — E67.1 HYPERCAROTENEMIA: ICD-10-CM

## 2025-04-24 DIAGNOSIS — I49.3 PVC'S (PREMATURE VENTRICULAR CONTRACTIONS): ICD-10-CM

## 2025-04-24 DIAGNOSIS — E78.2 MIXED HYPERLIPIDEMIA: ICD-10-CM

## 2025-04-24 PROCEDURE — 3017F COLORECTAL CA SCREEN DOC REV: CPT

## 2025-04-24 PROCEDURE — 1036F TOBACCO NON-USER: CPT

## 2025-04-24 PROCEDURE — 3074F SYST BP LT 130 MM HG: CPT

## 2025-04-24 PROCEDURE — G8427 DOCREV CUR MEDS BY ELIG CLIN: HCPCS

## 2025-04-24 PROCEDURE — G8417 CALC BMI ABV UP PARAM F/U: HCPCS

## 2025-04-24 PROCEDURE — 93000 ELECTROCARDIOGRAM COMPLETE: CPT

## 2025-04-24 PROCEDURE — 99214 OFFICE O/P EST MOD 30 MIN: CPT

## 2025-04-24 PROCEDURE — G2211 COMPLEX E/M VISIT ADD ON: HCPCS

## 2025-04-24 PROCEDURE — 3079F DIAST BP 80-89 MM HG: CPT

## 2025-04-24 NOTE — PATIENT INSTRUCTIONS
Continue amlodipine 5 mg daily  Continue losartan 100 mg daily  Continue Toprol-XL 12.5 mg daily  Continue aspirin 81 mg daily  Continue Lipitor 40 mg daily  Continue Plavix 75 mg daily  Daily heart rates and blood pressure--keep journal    Follow up in 6 months Jen ROUSE

## 2025-04-28 DIAGNOSIS — K21.9 GASTROESOPHAGEAL REFLUX DISEASE, UNSPECIFIED WHETHER ESOPHAGITIS PRESENT: ICD-10-CM

## 2025-04-28 RX ORDER — POTASSIUM CHLORIDE 1500 MG/1
20 TABLET, EXTENDED RELEASE ORAL DAILY
Qty: 90 TABLET | Refills: 3 | Status: SHIPPED | OUTPATIENT
Start: 2025-04-28

## 2025-04-28 RX ORDER — LOSARTAN POTASSIUM 100 MG/1
100 TABLET ORAL DAILY
Qty: 90 TABLET | Refills: 3 | OUTPATIENT
Start: 2025-04-28

## 2025-04-28 RX ORDER — AMLODIPINE BESYLATE 5 MG/1
5 TABLET ORAL DAILY
Qty: 90 TABLET | Refills: 3 | Status: SHIPPED | OUTPATIENT
Start: 2025-04-28

## 2025-04-28 RX ORDER — GABAPENTIN 400 MG/1
400 CAPSULE ORAL 3 TIMES DAILY
Qty: 90 CAPSULE | Refills: 0 | Status: SHIPPED | OUTPATIENT
Start: 2025-04-28 | End: 2025-05-28

## 2025-04-28 RX ORDER — OMEPRAZOLE 40 MG/1
40 CAPSULE, DELAYED RELEASE ORAL DAILY
Qty: 90 CAPSULE | Refills: 3 | Status: SHIPPED | OUTPATIENT
Start: 2025-04-28

## 2025-04-28 RX ORDER — CHLORTHALIDONE 25 MG/1
25 TABLET ORAL DAILY
Qty: 90 TABLET | Refills: 3 | Status: SHIPPED | OUTPATIENT
Start: 2025-04-28

## 2025-04-28 RX ORDER — ATORVASTATIN CALCIUM 40 MG/1
40 TABLET, FILM COATED ORAL NIGHTLY
Qty: 90 TABLET | Refills: 3 | Status: SHIPPED | OUTPATIENT
Start: 2025-04-28

## 2025-05-21 NOTE — TELEPHONE ENCOUNTER
Last Office Visit: 6/18/2024 Provider: DIONTE  **Is provider OOT? No    Next Office Visit: Visit date not found Provider:   **If no OV, when does pt need to be seen? in 1 year(s)

## 2025-05-22 RX ORDER — LOSARTAN POTASSIUM 100 MG/1
100 TABLET ORAL DAILY
Qty: 30 TABLET | Refills: 0 | Status: SHIPPED | OUTPATIENT
Start: 2025-05-22

## 2025-05-27 RX ORDER — GABAPENTIN 400 MG/1
400 CAPSULE ORAL 3 TIMES DAILY
Qty: 90 CAPSULE | Refills: 2 | Status: SHIPPED | OUTPATIENT
Start: 2025-05-27 | End: 2025-08-25

## 2025-06-12 DIAGNOSIS — I10 ESSENTIAL HYPERTENSION: Primary | ICD-10-CM

## 2025-06-12 RX ORDER — LOSARTAN POTASSIUM 100 MG/1
100 TABLET ORAL DAILY
Qty: 30 TABLET | Refills: 0 | Status: SHIPPED | OUTPATIENT
Start: 2025-06-12

## 2025-06-12 NOTE — TELEPHONE ENCOUNTER
Last Office Visit: 6/18/2024 Provider: DIONTE  **Is provider OOT? No    Next Office Visit: 09/18/25  Provider: DIONTE      LAST LABS:   BMP:  Lab Results   Component Value Date/Time     03/25/2024 03:07 PM    K 3.9 03/25/2024 03:07 PM    CL 95 03/25/2024 03:07 PM    CO2 30 03/25/2024 03:07 PM    BUN 13 03/25/2024 03:07 PM    CREATININE 0.8 03/25/2024 03:07 PM    GLUCOSE 97 03/25/2024 03:07 PM    CALCIUM 9.7 03/25/2024 03:07 PM    LABGLOM >90 03/25/2024 03:07 PM      Lab orders needed? yes - labs ordered, spoke with pt, pt v/u

## 2025-07-10 RX ORDER — LOSARTAN POTASSIUM 100 MG/1
100 TABLET ORAL DAILY
Qty: 30 TABLET | Refills: 1 | Status: SHIPPED | OUTPATIENT
Start: 2025-07-10

## 2025-07-10 NOTE — TELEPHONE ENCOUNTER
Last Office Visit: 6/18/2024 Provider: DIONTE  **Is provider OOT? No    Next Office Visit: 9/18/25 Provider: DIONTE

## 2025-07-28 RX ORDER — CLOPIDOGREL BISULFATE 75 MG/1
75 TABLET ORAL DAILY
Qty: 90 TABLET | Refills: 0 | Status: SHIPPED | OUTPATIENT
Start: 2025-07-28

## 2025-07-28 NOTE — TELEPHONE ENCOUNTER
Last Office Visit: 6/18/2024 Provider: DIONTE  **Is provider OOT? No    Next Office Visit: 10/14/25 Provider: DIONTE

## 2025-08-07 DIAGNOSIS — E78.2 MIXED HYPERLIPIDEMIA: ICD-10-CM

## 2025-08-07 DIAGNOSIS — I25.84 CORONARY ARTERY DISEASE DUE TO CALCIFIED CORONARY LESION: ICD-10-CM

## 2025-08-07 DIAGNOSIS — I10 ESSENTIAL HYPERTENSION: ICD-10-CM

## 2025-08-07 DIAGNOSIS — I25.10 CORONARY ARTERY DISEASE DUE TO CALCIFIED CORONARY LESION: ICD-10-CM

## 2025-08-07 DIAGNOSIS — E67.1 HYPERCAROTENEMIA: ICD-10-CM

## 2025-08-07 LAB
ALBUMIN SERPL-MCNC: 4.6 G/DL (ref 3.4–5)
ALBUMIN/GLOB SERPL: 1.9 {RATIO} (ref 1.1–2.2)
ALP SERPL-CCNC: 55 U/L (ref 40–129)
ALT SERPL-CCNC: 24 U/L (ref 10–40)
ANION GAP SERPL CALCULATED.3IONS-SCNC: 11 MMOL/L (ref 3–16)
AST SERPL-CCNC: 25 U/L (ref 15–37)
BASOPHILS # BLD: 0 K/UL (ref 0–0.2)
BASOPHILS NFR BLD: 0.9 %
BILIRUB SERPL-MCNC: 0.4 MG/DL (ref 0–1)
BUN SERPL-MCNC: 18 MG/DL (ref 7–20)
CALCIUM SERPL-MCNC: 9.6 MG/DL (ref 8.3–10.6)
CHLORIDE SERPL-SCNC: 97 MMOL/L (ref 99–110)
CHOLEST SERPL-MCNC: 199 MG/DL (ref 0–199)
CO2 SERPL-SCNC: 30 MMOL/L (ref 21–32)
CREAT SERPL-MCNC: 0.9 MG/DL (ref 0.8–1.3)
DEPRECATED RDW RBC AUTO: 13.2 % (ref 12.4–15.4)
EOSINOPHIL # BLD: 0.2 K/UL (ref 0–0.6)
EOSINOPHIL NFR BLD: 3.8 %
GFR SERPLBLD CREATININE-BSD FMLA CKD-EPI: >90 ML/MIN/{1.73_M2}
GLUCOSE SERPL-MCNC: 107 MG/DL (ref 70–99)
HCT VFR BLD AUTO: 44.7 % (ref 40.5–52.5)
HDLC SERPL-MCNC: 61 MG/DL (ref 40–60)
HGB BLD-MCNC: 15.6 G/DL (ref 13.5–17.5)
LDLC SERPL CALC-MCNC: 106 MG/DL
LYMPHOCYTES # BLD: 1.5 K/UL (ref 1–5.1)
LYMPHOCYTES NFR BLD: 30.6 %
MCH RBC QN AUTO: 29.6 PG (ref 26–34)
MCHC RBC AUTO-ENTMCNC: 34.9 G/DL (ref 31–36)
MCV RBC AUTO: 84.7 FL (ref 80–100)
MONOCYTES # BLD: 0.6 K/UL (ref 0–1.3)
MONOCYTES NFR BLD: 11.5 %
NEUTROPHILS # BLD: 2.6 K/UL (ref 1.7–7.7)
NEUTROPHILS NFR BLD: 53.2 %
PLATELET # BLD AUTO: 247 K/UL (ref 135–450)
PMV BLD AUTO: 8.2 FL (ref 5–10.5)
POTASSIUM SERPL-SCNC: 3.9 MMOL/L (ref 3.5–5.1)
PROT SERPL-MCNC: 7 G/DL (ref 6.4–8.2)
RBC # BLD AUTO: 5.28 M/UL (ref 4.2–5.9)
SODIUM SERPL-SCNC: 138 MMOL/L (ref 136–145)
TRIGL SERPL-MCNC: 162 MG/DL (ref 0–150)
TSH SERPL DL<=0.005 MIU/L-ACNC: 1.44 UIU/ML (ref 0.27–4.2)
VLDLC SERPL CALC-MCNC: 32 MG/DL
WBC # BLD AUTO: 4.9 K/UL (ref 4–11)

## 2025-08-11 RX ORDER — EZETIMIBE 10 MG/1
10 TABLET ORAL DAILY
Qty: 90 TABLET | Refills: 0 | Status: SHIPPED | OUTPATIENT
Start: 2025-08-11

## 2025-08-22 ENCOUNTER — OFFICE VISIT (OUTPATIENT)
Dept: INTERNAL MEDICINE CLINIC | Age: 60
End: 2025-08-22

## 2025-08-22 VITALS
HEART RATE: 63 BPM | HEIGHT: 70 IN | WEIGHT: 200.2 LBS | OXYGEN SATURATION: 93 % | DIASTOLIC BLOOD PRESSURE: 80 MMHG | BODY MASS INDEX: 28.66 KG/M2 | SYSTOLIC BLOOD PRESSURE: 124 MMHG | TEMPERATURE: 97.9 F

## 2025-08-22 DIAGNOSIS — N39.43 URINARY DRIBBLING: ICD-10-CM

## 2025-08-22 DIAGNOSIS — Z00.00 MEDICARE ANNUAL WELLNESS VISIT, SUBSEQUENT: Primary | ICD-10-CM

## 2025-08-22 DIAGNOSIS — Z12.11 COLON CANCER SCREENING: ICD-10-CM

## 2025-08-22 DIAGNOSIS — E78.5 HYPERLIPIDEMIA, UNSPECIFIED HYPERLIPIDEMIA TYPE: ICD-10-CM

## 2025-08-22 DIAGNOSIS — Z12.5 ENCOUNTER FOR SCREENING PROSTATE SPECIFIC ANTIGEN (PSA) MEASUREMENT: ICD-10-CM

## 2025-08-22 RX ORDER — GABAPENTIN 400 MG/1
400 CAPSULE ORAL 3 TIMES DAILY
Qty: 90 CAPSULE | Refills: 2 | Status: SHIPPED | OUTPATIENT
Start: 2025-08-22 | End: 2025-11-20

## 2025-08-22 RX ORDER — METOPROLOL SUCCINATE 25 MG/1
12.5 TABLET, EXTENDED RELEASE ORAL DAILY
Qty: 90 TABLET | Refills: 1 | Status: SHIPPED | OUTPATIENT
Start: 2025-08-22

## 2025-08-22 RX ORDER — ZOSTER VACCINE RECOMBINANT, ADJUVANTED 50 MCG/0.5
0.5 KIT INTRAMUSCULAR SEE ADMIN INSTRUCTIONS
Qty: 0.5 ML | Refills: 0 | Status: SHIPPED | OUTPATIENT
Start: 2025-08-22 | End: 2025-08-22 | Stop reason: SDUPTHER

## 2025-08-22 ASSESSMENT — PATIENT HEALTH QUESTIONNAIRE - PHQ9
8. MOVING OR SPEAKING SO SLOWLY THAT OTHER PEOPLE COULD HAVE NOTICED. OR THE OPPOSITE, BEING SO FIGETY OR RESTLESS THAT YOU HAVE BEEN MOVING AROUND A LOT MORE THAN USUAL: NOT AT ALL
10. IF YOU CHECKED OFF ANY PROBLEMS, HOW DIFFICULT HAVE THESE PROBLEMS MADE IT FOR YOU TO DO YOUR WORK, TAKE CARE OF THINGS AT HOME, OR GET ALONG WITH OTHER PEOPLE: NOT DIFFICULT AT ALL
SUM OF ALL RESPONSES TO PHQ QUESTIONS 1-9: 1
SUM OF ALL RESPONSES TO PHQ QUESTIONS 1-9: 1
6. FEELING BAD ABOUT YOURSELF - OR THAT YOU ARE A FAILURE OR HAVE LET YOURSELF OR YOUR FAMILY DOWN: NOT AT ALL
4. FEELING TIRED OR HAVING LITTLE ENERGY: NOT AT ALL
2. FEELING DOWN, DEPRESSED OR HOPELESS: SEVERAL DAYS
9. THOUGHTS THAT YOU WOULD BE BETTER OFF DEAD, OR OF HURTING YOURSELF: NOT AT ALL
SUM OF ALL RESPONSES TO PHQ QUESTIONS 1-9: 1
3. TROUBLE FALLING OR STAYING ASLEEP: NOT AT ALL
5. POOR APPETITE OR OVEREATING: NOT AT ALL
SUM OF ALL RESPONSES TO PHQ QUESTIONS 1-9: 1
1. LITTLE INTEREST OR PLEASURE IN DOING THINGS: NOT AT ALL
7. TROUBLE CONCENTRATING ON THINGS, SUCH AS READING THE NEWSPAPER OR WATCHING TELEVISION: NOT AT ALL

## 2025-08-22 ASSESSMENT — LIFESTYLE VARIABLES
HOW MANY STANDARD DRINKS CONTAINING ALCOHOL DO YOU HAVE ON A TYPICAL DAY: 1 OR 2
HOW OFTEN DO YOU HAVE A DRINK CONTAINING ALCOHOL: MONTHLY OR LESS

## 2025-08-23 RX ORDER — ZOSTER VACCINE RECOMBINANT, ADJUVANTED 50 MCG/0.5
0.5 KIT INTRAMUSCULAR SEE ADMIN INSTRUCTIONS
Qty: 0.5 ML | Refills: 0 | Status: SHIPPED | OUTPATIENT
Start: 2025-08-23 | End: 2026-02-19

## 2025-09-03 RX ORDER — LOSARTAN POTASSIUM 100 MG/1
100 TABLET ORAL DAILY
Qty: 60 TABLET | Refills: 0 | Status: SHIPPED | OUTPATIENT
Start: 2025-09-03

## (undated) DEVICE — SUTURE ETHILON SZ 3-0 L18IN NONABSORBABLE BLK L24MM PS-1 3/8 1663G

## (undated) DEVICE — SUTURE NONABSORBABLE MONOFILAMENT 4-0 PS-2 18 IN BLK ETHILON 1667G

## (undated) DEVICE — SYRINGE,EAR/ULCER, 2 OZ, STERILE: Brand: MEDLINE

## (undated) DEVICE — PENCIL SMK EVAC ALL IN 1 DSGN ENH VISIBILITY IMPROVED AIR

## (undated) DEVICE — SOLUTION IRRIG 500ML 0.9% SOD CHLO USP POUR PLAS BTL

## (undated) DEVICE — MINOR SET UP PACK: Brand: MEDLINE INDUSTRIES, INC.

## (undated) DEVICE — SURGICAL PROCEDURE PACK IV U-BAR

## (undated) DEVICE — SUTURE VICRYL + SZ 3-0 L27IN ABSRB UD L26MM SH 1/2 CIR VCP416H

## (undated) DEVICE — GOWN,SIRUS,NON REINFRCD,LARGE,SET IN SL: Brand: MEDLINE

## (undated) DEVICE — GLOVE ORANGE PI 7   MSG9070

## (undated) DEVICE — TUBING SUCT 10FR MAL ALUM SHFT FN CAP VENT UNIV CONN W/ OBT

## (undated) DEVICE — ELECTRODE,ECG,STRESS,FOAM,3PK: Brand: MEDLINE

## (undated) DEVICE — ELECTRODE PT RET AD L9FT HI MOIST COND ADH HYDRGEL CORDED

## (undated) DEVICE — APPLICATOR MEDICATED 10.5 CC SOLUTION HI LT ORNG CHLORAPREP

## (undated) DEVICE — GLOVE SURG SZ 75 L12IN FNGR THK94MIL STD WHT LTX FREE

## (undated) DEVICE — STERILE POLYISOPRENE POWDER-FREE SURGICAL GLOVES: Brand: PROTEXIS